# Patient Record
Sex: FEMALE | Race: WHITE | Employment: UNEMPLOYED | URBAN - METROPOLITAN AREA
[De-identification: names, ages, dates, MRNs, and addresses within clinical notes are randomized per-mention and may not be internally consistent; named-entity substitution may affect disease eponyms.]

---

## 2017-05-20 ENCOUNTER — OFFICE VISIT (OUTPATIENT)
Dept: INTERNAL MEDICINE CLINIC | Facility: CLINIC | Age: 32
End: 2017-05-20

## 2017-05-20 ENCOUNTER — LAB ENCOUNTER (OUTPATIENT)
Dept: LAB | Age: 32
End: 2017-05-20
Attending: FAMILY MEDICINE
Payer: COMMERCIAL

## 2017-05-20 VITALS
HEIGHT: 63 IN | HEART RATE: 64 BPM | TEMPERATURE: 98 F | RESPIRATION RATE: 16 BRPM | DIASTOLIC BLOOD PRESSURE: 60 MMHG | BODY MASS INDEX: 37.56 KG/M2 | WEIGHT: 212 LBS | SYSTOLIC BLOOD PRESSURE: 92 MMHG

## 2017-05-20 DIAGNOSIS — Z01.84 IMMUNITY STATUS TESTING: Primary | ICD-10-CM

## 2017-05-20 DIAGNOSIS — Z13.21 ENCOUNTER FOR VITAMIN DEFICIENCY SCREENING: ICD-10-CM

## 2017-05-20 DIAGNOSIS — N97.9 INFERTILITY, FEMALE: ICD-10-CM

## 2017-05-20 DIAGNOSIS — Z01.84 IMMUNITY STATUS TESTING: ICD-10-CM

## 2017-05-20 DIAGNOSIS — G47.33 OSA (OBSTRUCTIVE SLEEP APNEA): ICD-10-CM

## 2017-05-20 PROCEDURE — 86762 RUBELLA ANTIBODY: CPT | Performed by: FAMILY MEDICINE

## 2017-05-20 PROCEDURE — 99203 OFFICE O/P NEW LOW 30 MIN: CPT | Performed by: FAMILY MEDICINE

## 2017-05-20 PROCEDURE — 87340 HEPATITIS B SURFACE AG IA: CPT | Performed by: FAMILY MEDICINE

## 2017-05-20 PROCEDURE — 36415 COLL VENOUS BLD VENIPUNCTURE: CPT | Performed by: FAMILY MEDICINE

## 2017-05-20 PROCEDURE — 86735 MUMPS ANTIBODY: CPT | Performed by: FAMILY MEDICINE

## 2017-05-20 PROCEDURE — 86765 RUBEOLA ANTIBODY: CPT | Performed by: FAMILY MEDICINE

## 2017-05-20 PROCEDURE — 82306 VITAMIN D 25 HYDROXY: CPT | Performed by: FAMILY MEDICINE

## 2017-05-20 PROCEDURE — 86706 HEP B SURFACE ANTIBODY: CPT | Performed by: FAMILY MEDICINE

## 2017-05-20 PROCEDURE — 86787 VARICELLA-ZOSTER ANTIBODY: CPT | Performed by: FAMILY MEDICINE

## 2017-05-20 NOTE — PATIENT INSTRUCTIONS
Prevention Guidelines, Women Ages 25 to 44  Screening tests and vaccines are an important part of managing your health. Health counseling is essential, too. Below are guidelines for these, for women ages 25 to 44.  Talk with your healthcare provider to ma Chickenpox (varicella) All women in this age group who have no record of this infection or vaccine 2 doses; the second dose should be given 4 to 8 weeks after the first dose   Hepatitis A Women at increased risk for infection – talk with your healthcare pr Breast cancer and chemoprevention Women at high risk for breast cancer When your risk is known   Diet and exercise Women who are overweight or obese When diagnosed, and then at routine exams   Domestic violence Women at the age in which they are able to ha Breast cancer All women in this age group should talk with their healthcare providers about the need for clinical breast exams (CBE)1 Clinical breast exam every 3 years1   Cervical cancer Women ages 24 and older Women between ages 24 and 34 should have a P Haemophilus influenzae Type B (HIB) Women at increased risk for infection – talk with your healthcare provider 1 to 3 doses   Human papillomavirus (HPV) All women in this age group up to age 32 3 doses; the second dose should be given 1 to 2 months after t 1According to the ACS, women ages 21 to 44 years should have a clinical breast exam (CBE) as part of their routine health exam every 3 years, and breast self-exams are an option for women starting in their 20s. However, the  USPSTF does not recommend CBE. Diabetes mellitus, type 2 Adults with no symptoms who are overweight or obese and have 1 or more other risk factors for diabetes At least every 3 years   Gonorrhea Sexually active women at increased risk for infection At routine exams   Hepatitis C Anyone Pneumococcal conjugate vaccine (PCV13) and pneumococcal polysaccharide vaccine (PPSV23) Women at increased risk for infection – talk with your healthcare provider PCV13: 1 dose ages 23 to 72 (protects against 13 types of pneumococcal bacteria)  PPSV23: 1 t © 0043-9844 The [address] [place] [address], [address]. All rights reserved. This information is not intended as a substitute for professional medical care. Always follow your healthcare professional's instructions.         Prevent Tuberculosis Women at increased risk for infection – talk with your healthcare provider Ask your healthcare provider   Vision All women in this age group At least 1 complete exam in your 25s, and 2 in your 35s   Vaccine2 Who needs it How often   Chickenpox Tetanus/diphtheria/pertussis (Td/Tdap) booster All women in this age group Td every 10 years, or a one-time dose of Tdap instead of a Td booster after age 25, then Td every 10 years   Counseling Who needs it How often   BRCA gene mutation testing for breas Screening Who needs it How often   Alcohol misuse All women in this age group At routine exams   Blood pressure All women in this age group Every 2 years if your blood pressure is less than 120/80 mm Hg; yearly if your systolic blood pressure is 120 to 139 Hepatitis B Women at increased risk for infection – talk with your healthcare provider 3 doses over 6 months; second dose should be given 1 month after the first dose; the third dose should be given at least 2 months after the second dose and at least 4 mo Sexually transmitted infection prevention Women who are sexually active At routine exams   Skin cancer Prevention of skin cancer in fair-skinned adults through age 25 At routine exams   Use of tobacco and the health effects it can cause All women in this a Cervical cancer Women ages 24 and older Women between ages 24 and 34 should have a Pap test every 3 years; women between ages 27 and 72 are advised to have a Pap test plus an HPV test every 5 years   Chlamydia Sexually active women ages 25 and younger, and Human papillomavirus (HPV) All women in this age group up to age 32 3 doses; the second dose should be given 1 to 2 months after the first dose and the third dose given 6 months after the first dose   Influenza (flu) All women in this age group Once a year

## 2017-05-20 NOTE — PROGRESS NOTES
HPI:    Patient ID: Luz Conception is a 27year old female. HPI Here to establish care. 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 Needs titers of immunizations. Up to date on tetanus.    Sees Dr. Rishabh Parikh for infertility and h frequency. Musculoskeletal: Negative for joint swelling and arthralgias. Skin: Negative for rash. Neurological: Negative for dizziness, weakness, numbness and headaches. Hematological: Negative for adenopathy. Does not bruise/bleed easily.    Psychi Visit:  No prescriptions requested or ordered in this encounter    Imaging & Referrals:  None       NV#[id number]

## 2017-05-26 DIAGNOSIS — B06.89: Primary | ICD-10-CM

## 2017-05-26 DIAGNOSIS — R79.89 LOW VITAMIN D LEVEL: Primary | ICD-10-CM

## 2017-06-03 ENCOUNTER — APPOINTMENT (OUTPATIENT)
Dept: LAB | Facility: HOSPITAL | Age: 32
End: 2017-06-03
Attending: FAMILY MEDICINE
Payer: COMMERCIAL

## 2017-06-03 DIAGNOSIS — B06.89: ICD-10-CM

## 2017-06-03 PROCEDURE — 86762 RUBELLA ANTIBODY: CPT

## 2017-06-03 PROCEDURE — 36415 COLL VENOUS BLD VENIPUNCTURE: CPT

## 2018-01-11 ENCOUNTER — TELEPHONE (OUTPATIENT)
Dept: INTERNAL MEDICINE CLINIC | Facility: CLINIC | Age: 33
End: 2018-01-11

## 2018-01-11 DIAGNOSIS — Z13.1 SCREENING FOR DIABETES MELLITUS: ICD-10-CM

## 2018-01-11 DIAGNOSIS — Z13.220 SCREENING, LIPID: Primary | ICD-10-CM

## 2018-01-11 DIAGNOSIS — Z13.0 SCREENING FOR DEFICIENCY ANEMIA: ICD-10-CM

## 2018-01-11 NOTE — TELEPHONE ENCOUNTER
Patient having annual physical.  Please place orders with [facility]. Patient will be fasting.   Future Appointments  Date Time Provider Rosy Singletary   1/16/2018 9:30 AM Shari Miguel DO [facility] IM

## 2018-01-16 ENCOUNTER — TELEPHONE (OUTPATIENT)
Dept: INTERNAL MEDICINE CLINIC | Facility: CLINIC | Age: 33
End: 2018-01-16

## 2018-04-11 ENCOUNTER — OFFICE VISIT (OUTPATIENT)
Dept: INTERNAL MEDICINE CLINIC | Facility: CLINIC | Age: 33
End: 2018-04-11

## 2018-04-11 VITALS
SYSTOLIC BLOOD PRESSURE: 126 MMHG | RESPIRATION RATE: 16 BRPM | TEMPERATURE: 98 F | HEART RATE: 70 BPM | WEIGHT: 217 LBS | DIASTOLIC BLOOD PRESSURE: 78 MMHG | BODY MASS INDEX: 38 KG/M2

## 2018-04-11 DIAGNOSIS — G47.33 OSA (OBSTRUCTIVE SLEEP APNEA): ICD-10-CM

## 2018-04-11 DIAGNOSIS — E03.9 HYPOTHYROIDISM, UNSPECIFIED TYPE: ICD-10-CM

## 2018-04-11 DIAGNOSIS — Z00.00 ANNUAL PHYSICAL EXAM: Primary | ICD-10-CM

## 2018-04-11 DIAGNOSIS — N97.9 INFERTILITY, FEMALE: ICD-10-CM

## 2018-04-11 PROCEDURE — 99395 PREV VISIT EST AGE 18-39: CPT | Performed by: FAMILY MEDICINE

## 2018-04-11 RX ORDER — DOXYCYCLINE HYCLATE 100 MG
TABLET ORAL
Refills: 0 | COMMUNITY
Start: 2018-04-04 | End: 2018-06-22

## 2018-04-11 RX ORDER — CHOLECALCIFEROL (VITAMIN D3) 1250 MCG
CAPSULE ORAL
Refills: 1 | COMMUNITY
Start: 2018-04-02 | End: 2020-01-15

## 2018-04-11 RX ORDER — ASPIRIN 81 MG
TABLET, DELAYED RELEASE (ENTERIC COATED) ORAL
Refills: 1 | COMMUNITY
Start: 2018-04-04 | End: 2020-01-15

## 2018-04-12 PROBLEM — E03.9 HYPOTHYROIDISM: Status: ACTIVE | Noted: 2018-04-12

## 2018-04-12 NOTE — PROGRESS NOTES
HPI:    Patient ID: Jose Antonio Crawley is a 34year old female. HPI Here for annual check-up. Patient is undergoing fertility treatment and has just recently started IVF. No complaints. Trying to eat healthy and exercise. Up to date on pap.  Labs done headaches. Hematological: Negative for adenopathy. Does not bruise/bleed easily. Psychiatric/Behavioral: Negative for dysphoric mood. The patient is not nervous/anxious.              Current Outpatient Prescriptions:  ASPIR-LOW 81 MG Oral Tab EC  Disp: apnea)  Hypothyroidism, unspecified type  1. Reviewed preventive health recommendations with patient. Check labs. Encouraged regular exercise and healthy eating. 2. Continue f/u with Fertility center. 3. Controlled on CPAP. Continue.    4. Reviewed labs

## 2018-04-16 ENCOUNTER — EKG ENCOUNTER (OUTPATIENT)
Dept: LAB | Age: 33
End: 2018-04-16
Attending: OBSTETRICS & GYNECOLOGY
Payer: COMMERCIAL

## 2018-04-16 DIAGNOSIS — Z01.810 PRE-OPERATIVE CARDIOVASCULAR EXAMINATION: Primary | ICD-10-CM

## 2018-04-16 PROCEDURE — 93005 ELECTROCARDIOGRAM TRACING: CPT

## 2018-04-16 PROCEDURE — 93010 ELECTROCARDIOGRAM REPORT: CPT | Performed by: INTERNAL MEDICINE

## 2018-05-07 ENCOUNTER — TELEPHONE (OUTPATIENT)
Dept: INTERNAL MEDICINE CLINIC | Facility: CLINIC | Age: 33
End: 2018-05-07

## 2018-05-07 NOTE — TELEPHONE ENCOUNTER
Patient states she is getting a job as a  and needs a TB skin test.  She would like to know if Dr Zenaida Roberts would order this test?  Please advise.

## 2018-05-08 NOTE — TELEPHONE ENCOUNTER
Patient states no previous positive TB tests. Patient is scheduled.   Future Appointments  Date Time Provider Rosy Singletary   5/9/2018 1:00 PM [facility] 35 NURSE [facility] IM   5/11/2018 4:00 PM EMG 35 NURSE [facility] 75[facility] IM

## 2018-05-09 ENCOUNTER — NURSE ONLY (OUTPATIENT)
Dept: INTERNAL MEDICINE CLINIC | Facility: CLINIC | Age: 33
End: 2018-05-09

## 2018-05-09 PROCEDURE — 86580 TB INTRADERMAL TEST: CPT | Performed by: FAMILY MEDICINE

## 2018-05-11 ENCOUNTER — TELEPHONE (OUTPATIENT)
Dept: INTERNAL MEDICINE CLINIC | Facility: CLINIC | Age: 33
End: 2018-05-11

## 2018-05-11 NOTE — TELEPHONE ENCOUNTER
Called patient in regards to today's No Show. LVM that we close at 4:30 today. I also explained that the nurse stresses the importance of having the TB read today, or she will have to start the testing over.

## 2018-05-22 ENCOUNTER — OFFICE VISIT (OUTPATIENT)
Dept: FAMILY MEDICINE CLINIC | Facility: CLINIC | Age: 33
End: 2018-05-22

## 2018-05-22 DIAGNOSIS — Z11.1 SCREENING EXAMINATION FOR PULMONARY TUBERCULOSIS: Primary | ICD-10-CM

## 2018-05-22 PROCEDURE — 86580 TB INTRADERMAL TEST: CPT | Performed by: PHYSICIAN ASSISTANT

## 2018-05-22 NOTE — PATIENT INSTRUCTIONS
Please return to clinic on 5/24/18 after 515pm or on 5/25/18 before 515pm to have your TB test read.     If you do not return during this time your test will need to be repeated.      Our clinic hours are:  Monday-Friday        8:00 am to 7:30 pm  Saturday

## 2018-05-22 NOTE — PROGRESS NOTES
Linda Gonzalez is a 34year old female who presents for TB testing. TUBERCULOSIS SCREENING QUESTIONNAIRE    · Live vaccines in the past month? no  · Any steroid medication in the past month? no  · History of BCG vaccine?     no  · If female, ar

## 2018-05-24 ENCOUNTER — OFFICE VISIT (OUTPATIENT)
Dept: FAMILY MEDICINE CLINIC | Facility: CLINIC | Age: 33
End: 2018-05-24

## 2018-05-24 DIAGNOSIS — Z11.1 SCREENING FOR TUBERCULOSIS: Primary | ICD-10-CM

## 2018-05-24 NOTE — PROGRESS NOTES
Patient reports to clinic at 48 hours after Tb placement. TB read negative. Letter of proof printed for patient.

## 2018-06-13 ENCOUNTER — TELEPHONE (OUTPATIENT)
Dept: INTERNAL MEDICINE CLINIC | Facility: CLINIC | Age: 33
End: 2018-06-13

## 2018-06-13 NOTE — TELEPHONE ENCOUNTER
Pt came to drop off 3 forms for different [employer]. Please call once ready for . She had her CPE on 4/11/18. Forms given to [facility].

## 2018-06-22 ENCOUNTER — HOSPITAL ENCOUNTER (OUTPATIENT)
Age: 33
Discharge: HOME | End: 2018-06-22
Attending: FAMILY MEDICINE
Payer: COMMERCIAL

## 2018-06-22 VITALS
RESPIRATION RATE: 18 BRPM | TEMPERATURE: 99 F | HEART RATE: 81 BPM | DIASTOLIC BLOOD PRESSURE: 86 MMHG | OXYGEN SATURATION: 100 % | SYSTOLIC BLOOD PRESSURE: 137 MMHG

## 2018-06-22 DIAGNOSIS — J02.9 SORE THROAT: Primary | ICD-10-CM

## 2018-06-22 PROCEDURE — 99214 OFFICE O/P EST MOD 30 MIN: CPT

## 2018-06-22 PROCEDURE — 87081 CULTURE SCREEN ONLY: CPT | Performed by: FAMILY MEDICINE

## 2018-06-22 PROCEDURE — 99203 OFFICE O/P NEW LOW 30 MIN: CPT

## 2018-06-22 PROCEDURE — 87430 STREP A AG IA: CPT | Performed by: FAMILY MEDICINE

## 2018-06-23 NOTE — ED PROVIDER NOTES
Patient Seen in: [facility]    History   Patient presents with:  Sore Throat    Stated Complaint: sore throat    HPI    This 60-year-old female presents the office with complaint of sore throat for about a week.   She states her seen,airway patent, uvula midline  NECK:  Shotty anterior cervical lymphadenopathy. No thyromegaly,  HEART: Regular rate and rhythm, no S3, S4 or murmur noted. LUNGS: Clear to ausculation. No retractions or tachypnea noted. EXTREMITIES: No edema noted.

## 2018-09-21 ENCOUNTER — LAB ENCOUNTER (OUTPATIENT)
Dept: LAB | Age: 33
End: 2018-09-21
Attending: OBSTETRICS & GYNECOLOGY
Payer: COMMERCIAL

## 2018-09-21 DIAGNOSIS — Z01.810 PRE-OPERATIVE CARDIOVASCULAR EXAMINATION: Primary | ICD-10-CM

## 2018-09-21 PROCEDURE — 93010 ELECTROCARDIOGRAM REPORT: CPT | Performed by: OBSTETRICS & GYNECOLOGY

## 2018-09-21 PROCEDURE — 93005 ELECTROCARDIOGRAM TRACING: CPT

## 2018-10-13 ENCOUNTER — LAB ENCOUNTER (OUTPATIENT)
Dept: LAB | Facility: HOSPITAL | Age: 33
End: 2018-10-13
Attending: FAMILY MEDICINE
Payer: COMMERCIAL

## 2018-10-13 DIAGNOSIS — Z01.84 IMMUNITY STATUS TESTING: ICD-10-CM

## 2018-10-13 PROCEDURE — 36415 COLL VENOUS BLD VENIPUNCTURE: CPT

## 2018-10-13 PROCEDURE — 86765 RUBEOLA ANTIBODY: CPT

## 2018-10-13 PROCEDURE — 86735 MUMPS ANTIBODY: CPT

## 2018-10-13 PROCEDURE — 86762 RUBELLA ANTIBODY: CPT

## 2018-10-19 ENCOUNTER — OFFICE VISIT (OUTPATIENT)
Dept: FAMILY MEDICINE CLINIC | Facility: CLINIC | Age: 33
End: 2018-10-19
Payer: COMMERCIAL

## 2018-10-19 DIAGNOSIS — Z23 NEED FOR VACCINATION: Primary | ICD-10-CM

## 2018-10-19 PROCEDURE — 90471 IMMUNIZATION ADMIN: CPT | Performed by: NURSE PRACTITIONER

## 2018-10-19 PROCEDURE — 90715 TDAP VACCINE 7 YRS/> IM: CPT | Performed by: NURSE PRACTITIONER

## 2018-11-30 ENCOUNTER — OFFICE VISIT (OUTPATIENT)
Dept: FAMILY MEDICINE CLINIC | Facility: CLINIC | Age: 33
End: 2018-11-30
Payer: COMMERCIAL

## 2018-11-30 DIAGNOSIS — Z23 NEED FOR TD VACCINE: Primary | ICD-10-CM

## 2018-11-30 PROCEDURE — 90714 TD VACC NO PRESV 7 YRS+ IM: CPT | Performed by: PHYSICIAN ASSISTANT

## 2018-11-30 PROCEDURE — 90471 IMMUNIZATION ADMIN: CPT | Performed by: PHYSICIAN ASSISTANT

## 2018-11-30 NOTE — PROGRESS NOTES
Here for Td vaccine. Reports no previous reaction to any vaccine. Feels well today. Consent completed and reviewed. VIS given. Td vaccine administered to left deltoid. Pt tolerated well. Patient needs 2 doses of Td vaccine spaced 6 months apart.  Had Tdap

## 2019-05-17 ENCOUNTER — TELEPHONE (OUTPATIENT)
Dept: INTERNAL MEDICINE CLINIC | Facility: CLINIC | Age: 34
End: 2019-05-17

## 2019-05-17 DIAGNOSIS — Z00.00 ROUTINE GENERAL MEDICAL EXAMINATION AT A HEALTH CARE FACILITY: Primary | ICD-10-CM

## 2019-05-17 NOTE — TELEPHONE ENCOUNTER
Future Appointments   Date Time Provider Rosy Singletary   5/31/2019  8:30 AM Ivan Gomez DO [facility] IM   7/15/2019  3:00 PM [facility] [id number]     Orders to  Edward-Pt aware to fast-no call back required

## 2019-05-31 ENCOUNTER — TELEPHONE (OUTPATIENT)
Dept: INTERNAL MEDICINE CLINIC | Facility: CLINIC | Age: 34
End: 2019-05-31

## 2019-06-25 ENCOUNTER — HOSPITAL ENCOUNTER (OUTPATIENT)
Age: 34
Discharge: HOME | End: 2019-06-25
Attending: FAMILY MEDICINE
Payer: COMMERCIAL

## 2019-06-25 VITALS
BODY MASS INDEX: 38.09 KG/M2 | HEIGHT: 63 IN | TEMPERATURE: 98 F | WEIGHT: 215 LBS | RESPIRATION RATE: 18 BRPM | OXYGEN SATURATION: 97 % | SYSTOLIC BLOOD PRESSURE: 122 MMHG | DIASTOLIC BLOOD PRESSURE: 77 MMHG | HEART RATE: 85 BPM

## 2019-06-25 DIAGNOSIS — O26.892 ABDOMINAL PAIN DURING PREGNANCY IN SECOND TRIMESTER: Primary | ICD-10-CM

## 2019-06-25 DIAGNOSIS — R10.9 ABDOMINAL PAIN DURING PREGNANCY IN SECOND TRIMESTER: Primary | ICD-10-CM

## 2019-06-25 PROCEDURE — 87086 URINE CULTURE/COLONY COUNT: CPT | Performed by: FAMILY MEDICINE

## 2019-06-25 PROCEDURE — 99213 OFFICE O/P EST LOW 20 MIN: CPT

## 2019-06-25 PROCEDURE — 81002 URINALYSIS NONAUTO W/O SCOPE: CPT | Performed by: FAMILY MEDICINE

## 2019-06-25 PROCEDURE — 99214 OFFICE O/P EST MOD 30 MIN: CPT

## 2019-06-25 RX ORDER — MELATONIN
325
COMMUNITY
End: 2020-01-15

## 2019-06-25 RX ORDER — CETIRIZINE HYDROCHLORIDE 10 MG/1
10 TABLET ORAL DAILY
COMMUNITY

## 2019-06-25 NOTE — ED INITIAL ASSESSMENT (HPI)
Patient is 17 weeks pregnant and is here today for lower abdominal pain/tightness. Pain started yesterday around 1600. Feels like a \"golf ball\" inside right hip and pain radiates to lower abdomen. Also has low mid back pain/spasms.   Denies vaginal dis

## 2019-06-25 NOTE — ED PROVIDER NOTES
Patient Seen in: [facility]    History   Patient presents with:  Abdomen/Flank Pain (GI/)    Stated Complaint: 17 weeks pregnant, abdominal discomfort     HPI    78-year-old female at 12 weeks gestational age presents the Pulse 85   Temp 98.1 °F (36.7 °C) (Temporal)   Resp 18   Ht 160 cm (5' 3\")   Wt 97.5 kg   SpO2 97%   BMI 38.09 kg/m²         Physical Exam    Gen: Pleasant female in NAD  Head: Normocephalic atraumatic. No sinus tenderness on palpation.   Ears: Normal ext her obstetrician              Disposition and Plan     Clinical Impression:  Abdominal pain during pregnancy in second trimester  (primary encounter diagnosis)    Disposition:  Discharge  6/25/2019 12:25 pm    Follow-up:  García Burton MD  [address] [place]

## 2019-07-15 ENCOUNTER — OFFICE VISIT (OUTPATIENT)
Dept: PERINATAL CARE | Facility: HOSPITAL | Age: 34
End: 2019-07-15
Attending: OBSTETRICS & GYNECOLOGY

## 2019-07-15 VITALS
HEIGHT: 63 IN | WEIGHT: 215 LBS | HEART RATE: 118 BPM | DIASTOLIC BLOOD PRESSURE: 83 MMHG | BODY MASS INDEX: 38.09 KG/M2 | SYSTOLIC BLOOD PRESSURE: 132 MMHG

## 2019-07-15 DIAGNOSIS — O09.812 PREGNANCY RESULTING FROM IN VITRO FERTILIZATION IN SECOND TRIMESTER: ICD-10-CM

## 2019-07-15 DIAGNOSIS — O09.522 MULTIGRAVIDA OF ADVANCED MATERNAL AGE IN SECOND TRIMESTER: ICD-10-CM

## 2019-07-15 DIAGNOSIS — G47.33 OSA (OBSTRUCTIVE SLEEP APNEA): ICD-10-CM

## 2019-07-15 DIAGNOSIS — E66.09 NON MORBID OBESITY DUE TO EXCESS CALORIES: ICD-10-CM

## 2019-07-15 PROCEDURE — 99243 OFF/OP CNSLTJ NEW/EST LOW 30: CPT | Performed by: OBSTETRICS & GYNECOLOGY

## 2019-07-15 PROCEDURE — 76811 OB US DETAILED SNGL FETUS: CPT | Performed by: OBSTETRICS & GYNECOLOGY

## 2019-07-15 NOTE — PROGRESS NOTES
Indication: AMA, IVF, Increased BMI.   ____________________________________________________________________________  History: Age: 28 years. Maternal age at [facility]: 27 years.  : 1 Para: 0.  _____________________________________________________________ suboptimal.    ____________________________________________________________________________  Maternal Structures:  Cervical length 48.0 mm.  ____________________________________________________________________________  Comments:    Mattie Bonilla , hypertension and diabetes. The incidence of preeclampsia in the general obstetric population is 3 to 4 percent; this increases to 5 to 10 percent in women over age 36 and is as high as 28 percent in women over age 48.    The incidence of gestational diabe karyotype.       Fetal Aneuploidy      Invasive Testing  I offered invasive genetic testing (amniocentesis, chorionic villus sampling) after reviewing the diagnostic accuracy of these tests as well as the procedure associated loss rate (1:500 for genetic am offspring with congenital malformations compared with fertile women who conceive naturally. Heart defects have been reported as high at 6 % so fetal echocardiography is recommended in all IVF patients.  Stillbirth and  mortality rates appear to however, obese women with a history of gestational diabetes have a two-fold increased prevalence of subsequent type 2 diabetes. An association between obesity and hypertensive disorders during pregnancy has been consistently reported.   In particu measurements are consistent with the established EDC. No ultrasound evidence of structural abnormalities are seen today. The nasal bone is present. No ultrasound evidence of markers for aneuploidy are seen.  She understands that ultrasound exam cannot ex

## 2019-08-09 NOTE — PROGRESS NOTES
Jack Grover  Dear Dr. Haylee Bonilla,     Thank you for requesting ultrasound evaluation and maternal fetal medicine consultation on your patient Eric Middleton.   As you are aware she is a 27year old female  with a Si examined.    ____________________________________________________________________________  Echocardiography:    Image quality:  Limited  Situs:  Normal  Position of stomach:  Left sided  Heart size:  Normal  Position of apex:  normal  Systemic veins:  Norm evaluated.       DISCUSSION  During her visit we discussed and reviewed the following issues:  Advanced maternal age  We reviewed the risks, both maternal and fetal, of advancing maternal age and pregnancy.   We discussed risks for fetal growth abnormalitie airflow) during sleep despite respiratory effort. ALYSSA is characterized by repetitive partial or complete episodes of upper airway obstruction during sleep.  The obstruction results in a reduction of airflow, hypoxemia, sympathetic discharge, and recurrent a anesthesia service is consulted prenatally, but at least early in the patient’s labor. Early placement of regional anesthesia may prevent the need for general anesthesia if emergent  delivery becomes necessary.  Women who have been using CPAP, anoth

## 2019-08-15 ENCOUNTER — OFFICE VISIT (OUTPATIENT)
Dept: PERINATAL CARE | Facility: HOSPITAL | Age: 34
End: 2019-08-15
Attending: OBSTETRICS & GYNECOLOGY
Payer: COMMERCIAL

## 2019-08-15 VITALS
SYSTOLIC BLOOD PRESSURE: 142 MMHG | DIASTOLIC BLOOD PRESSURE: 89 MMHG | BODY MASS INDEX: 38.8 KG/M2 | HEIGHT: 63 IN | WEIGHT: 219 LBS | HEART RATE: 109 BPM

## 2019-08-15 DIAGNOSIS — O99.212 OBESITY AFFECTING PREGNANCY IN SECOND TRIMESTER: ICD-10-CM

## 2019-08-15 DIAGNOSIS — O09.812 PREGNANCY RESULTING FROM ASSISTED REPRODUCTIVE TECHNOLOGY IN SECOND TRIMESTER: ICD-10-CM

## 2019-08-15 DIAGNOSIS — M54.50 CHRONIC BILATERAL LOW BACK PAIN WITHOUT SCIATICA: ICD-10-CM

## 2019-08-15 DIAGNOSIS — G89.29 CHRONIC BILATERAL LOW BACK PAIN WITHOUT SCIATICA: ICD-10-CM

## 2019-08-15 DIAGNOSIS — E03.9 HYPOTHYROIDISM, UNSPECIFIED TYPE: ICD-10-CM

## 2019-08-15 DIAGNOSIS — E66.09 NON MORBID OBESITY DUE TO EXCESS CALORIES: ICD-10-CM

## 2019-08-15 DIAGNOSIS — G47.33 OSA (OBSTRUCTIVE SLEEP APNEA): ICD-10-CM

## 2019-08-15 PROCEDURE — 76827 ECHO EXAM OF FETAL HEART: CPT | Performed by: OBSTETRICS & GYNECOLOGY

## 2019-08-15 PROCEDURE — 76815 OB US LIMITED FETUS(S): CPT | Performed by: OBSTETRICS & GYNECOLOGY

## 2019-08-15 PROCEDURE — 99215 OFFICE O/P EST HI 40 MIN: CPT | Performed by: OBSTETRICS & GYNECOLOGY

## 2019-08-15 PROCEDURE — 76825 ECHO EXAM OF FETAL HEART: CPT | Performed by: OBSTETRICS & GYNECOLOGY

## 2019-08-15 PROCEDURE — 93325 DOPPLER ECHO COLOR FLOW MAPG: CPT | Performed by: OBSTETRICS & GYNECOLOGY

## 2019-09-11 LAB
AMB EXT TSH: 2.26 MIU/ML
ANTIBODY SCREEN: NEGATIVE
FERRITIN: 31 NG/ML
GLUCOSE, 1 HOUR: 170 MG/DL
HCT: 35.2 PERCENT
HGB: 11.3 GM/DL
MEAN CELL VOLUME: 93 FL
MEAN CORPUSCULAR HEMOGLOBIN: 30 PG
MEAN CORPUSCULAR HGB CONC: 32 PERCENT
MEAN PLATELET VOLUME: 10.9 FL
PLT: 289 K/CU MM
RED BLOOD COUNT: 3.8 M/CU MM
RED CELL DISTRIBUTION WIDTH: 14 PERCENT
T3 TOTAL: 2.3 NG/ML
T4, FREE: 0.64 NG/DL
VITAMIN D, 25-HYDROXY: 38 NG/ML
WBC: 10 K/CU MM

## 2019-09-13 ENCOUNTER — TELEPHONE (OUTPATIENT)
Dept: INTERNAL MEDICINE CLINIC | Facility: CLINIC | Age: 34
End: 2019-09-13

## 2019-09-13 NOTE — TELEPHONE ENCOUNTER
Received lab results from [facility] stating pt to schedule f/u appt with AMS. Abstracted and placed in [facility]MS top bin for review. Called pt and scheduled appt for f/u on 9/20 at 11:45a. Pt verbalized understanding.

## 2019-09-15 ENCOUNTER — HOSPITAL ENCOUNTER (OUTPATIENT)
Facility: HOSPITAL | Age: 34
Setting detail: OBSERVATION
Discharge: HOME | End: 2019-09-15
Attending: OBSTETRICS & GYNECOLOGY | Admitting: OBSTETRICS & GYNECOLOGY
Payer: COMMERCIAL

## 2019-09-15 VITALS
WEIGHT: 221 LBS | BODY MASS INDEX: 39 KG/M2 | SYSTOLIC BLOOD PRESSURE: 121 MMHG | TEMPERATURE: 98 F | DIASTOLIC BLOOD PRESSURE: 72 MMHG | HEART RATE: 98 BPM | RESPIRATION RATE: 16 BRPM

## 2019-09-15 PROBLEM — Z34.90 PREGNANCY: Status: ACTIVE | Noted: 2019-09-15

## 2019-09-15 PROCEDURE — 99213 OFFICE O/P EST LOW 20 MIN: CPT

## 2019-09-15 RX ORDER — CALCIUM CARBONATE 200(500)MG
1 TABLET,CHEWABLE ORAL DAILY
COMMUNITY

## 2019-09-15 NOTE — NST
Nonstress Test   Patient: Marcella Rowell    Gestation: 29w1d    NST:       Variability: Moderate           Accelerations: Yes           Decelerations: None            Baseline: 140 BPM           Uterine Irritability: No           Contractions: Not pr

## 2019-09-15 NOTE — PROGRESS NOTES
Updated  about the pt status and fht's.   Orders received to discharge pt home with  labor instruction and follow up in the [facility] office for the next scheduled appointment
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
## 2019-09-15 NOTE — PROGRESS NOTES
Discharge instruction given to pt, pt verbalizes understanding, vital signs stable on transfer, All pt belongings sent with pt on transfer. pt is not in active labor on discharge.

## 2019-09-19 NOTE — PROGRESS NOTES
Hermelinda Jarrett  Dear Dr. Cj Maguire,     Thank you for requesting ultrasound evaluation and maternal fetal medicine consultation on your patient Gissell Tipton.  As you are aware she is a 27year Adam Hem a Si Fetal Anatomy:  Visualized with normal appearance: 4 chamber heart and great vessels, bladder. Brain: Visualized and normal appearance: cerebellum. Gastrointestinal Tract: stomach visible. Summary of Ultrasound Findings:  Impression:  The fetal s repeat consultation if medication needed for glucose control    AMA  See prior MFM notes for a detailed review. IVF GESTATION  See prior MFM notes for a detailed review.     OBESITY:  Her BMI prior to pregnancy was 45  See prior MFM notes for a detailed pregnancy  · High BMI  · AMA  · ALYSSA  · GDM  · Large for gestational age growth and polyhydramnios: Likely secondary to gestational diabetes     RECOMMENDATIONS:  · Continue care with Dr. Rachana Levi four times daily capillary blood glucose assessmen

## 2019-09-20 ENCOUNTER — OFFICE VISIT (OUTPATIENT)
Dept: PERINATAL CARE | Facility: HOSPITAL | Age: 34
End: 2019-09-20
Attending: OBSTETRICS & GYNECOLOGY
Payer: COMMERCIAL

## 2019-09-20 VITALS
HEART RATE: 99 BPM | BODY MASS INDEX: 40 KG/M2 | WEIGHT: 223 LBS | SYSTOLIC BLOOD PRESSURE: 121 MMHG | DIASTOLIC BLOOD PRESSURE: 86 MMHG

## 2019-09-20 DIAGNOSIS — G47.33 OSA (OBSTRUCTIVE SLEEP APNEA): ICD-10-CM

## 2019-09-20 DIAGNOSIS — O24.419 GESTATIONAL DIABETES MELLITUS (GDM) IN THIRD TRIMESTER, GESTATIONAL DIABETES METHOD OF CONTROL UNSPECIFIED: Primary | ICD-10-CM

## 2019-09-20 PROCEDURE — 76816 OB US FOLLOW-UP PER FETUS: CPT | Performed by: OBSTETRICS & GYNECOLOGY

## 2019-09-20 PROCEDURE — 99242 OFF/OP CONSLTJ NEW/EST SF 20: CPT | Performed by: OBSTETRICS & GYNECOLOGY

## 2019-09-23 ENCOUNTER — DIABETIC EDUCATION (OUTPATIENT)
Dept: ENDOCRINOLOGY CLINIC | Facility: CLINIC | Age: 34
End: 2019-09-23
Payer: COMMERCIAL

## 2019-09-23 ENCOUNTER — TELEPHONE (OUTPATIENT)
Dept: INTERNAL MEDICINE CLINIC | Facility: CLINIC | Age: 34
End: 2019-09-23

## 2019-09-23 VITALS — BODY MASS INDEX: 39.51 KG/M2 | WEIGHT: 223 LBS | HEIGHT: 63 IN

## 2019-09-23 DIAGNOSIS — O24.410 DIET CONTROLLED GESTATIONAL DIABETES MELLITUS (GDM) IN THIRD TRIMESTER: Primary | ICD-10-CM

## 2019-09-23 PROCEDURE — 97802 MEDICAL NUTRITION INDIV IN: CPT | Performed by: DIETITIAN, REGISTERED

## 2019-09-23 NOTE — PROGRESS NOTES
Santi Nancias was seen for Gestational Diabetes Counseling: Individual/Group    Date: 9/23/2019  Referring Provider: Dr. Alex Harding  Start time: 2:30 End time: 3:45    Assessment:Ht 63\"   Wt 223 lb   BMI 39.50 kg/m²     SANDRA: 11/20/19 management/prevention of Type 2 DM, and increased risk of having diabetes later in life reviewed. Healthy Coping:  Family involvement/social support encouraged. Identification of lifestyle behaviors willing to change discussed.     Training Tools Provid

## 2019-09-25 ENCOUNTER — OFFICE VISIT (OUTPATIENT)
Dept: INTERNAL MEDICINE CLINIC | Facility: CLINIC | Age: 34
End: 2019-09-25
Payer: COMMERCIAL

## 2019-09-25 VITALS
SYSTOLIC BLOOD PRESSURE: 114 MMHG | BODY MASS INDEX: 39.34 KG/M2 | HEART RATE: 88 BPM | TEMPERATURE: 98 F | DIASTOLIC BLOOD PRESSURE: 70 MMHG | RESPIRATION RATE: 18 BRPM | WEIGHT: 222 LBS | HEIGHT: 63 IN

## 2019-09-25 DIAGNOSIS — Z3A.30 30 WEEKS GESTATION OF PREGNANCY: ICD-10-CM

## 2019-09-25 DIAGNOSIS — E03.8 SUBCLINICAL HYPOTHYROIDISM: Primary | ICD-10-CM

## 2019-09-25 PROCEDURE — 99214 OFFICE O/P EST MOD 30 MIN: CPT | Performed by: FAMILY MEDICINE

## 2019-09-26 LAB — AMB EXT TSH: 2.11 MIU/ML

## 2019-09-27 ENCOUNTER — MED REC SCAN ONLY (OUTPATIENT)
Dept: INTERNAL MEDICINE CLINIC | Facility: CLINIC | Age: 34
End: 2019-09-27

## 2019-09-27 NOTE — PROGRESS NOTES
HPI:    Patient ID: Patience Reynolds is a 27year old female. HPI  Here at request of OB. Patient is 30+ weeks pregnant after IVF.  Was on levothyroxine for reported subclinical hypothyroid until 12 weeks pregnancy and stopped med at instruction from Normocephalic and atraumatic. Pulmonary/Chest: Effort normal.   Neurological: She is alert. Psychiatric: She has a normal mood and affect. Her behavior is normal.   Vitals reviewed.              ASSESSMENT/PLAN:   Subclinical hypothyroidism  (primary en

## 2019-09-28 ENCOUNTER — TELEPHONE (OUTPATIENT)
Dept: INTERNAL MEDICINE CLINIC | Facility: CLINIC | Age: 34
End: 2019-09-28

## 2019-09-28 NOTE — TELEPHONE ENCOUNTER
Spoke with  Geraldine Wendy at his office visit today to inform him to tell wife her T4 was still low so still subclinical hypothyroid on repeat labs and that she should restart levothyroxine and recheck levels in 6 weeks. Rx sent to pharmacy.  However, up

## 2019-10-18 ENCOUNTER — OFFICE VISIT (OUTPATIENT)
Dept: PERINATAL CARE | Facility: HOSPITAL | Age: 34
End: 2019-10-18
Attending: OBSTETRICS & GYNECOLOGY
Payer: COMMERCIAL

## 2019-10-18 VITALS
BODY MASS INDEX: 39 KG/M2 | HEART RATE: 73 BPM | SYSTOLIC BLOOD PRESSURE: 122 MMHG | WEIGHT: 218 LBS | DIASTOLIC BLOOD PRESSURE: 86 MMHG

## 2019-10-18 DIAGNOSIS — O24.410 DIET CONTROLLED GESTATIONAL DIABETES MELLITUS (GDM) IN THIRD TRIMESTER: ICD-10-CM

## 2019-10-18 DIAGNOSIS — G47.33 OSA (OBSTRUCTIVE SLEEP APNEA): ICD-10-CM

## 2019-10-18 DIAGNOSIS — O99.213 OBESITY AFFECTING PREGNANCY IN THIRD TRIMESTER: ICD-10-CM

## 2019-10-18 PROCEDURE — 76816 OB US FOLLOW-UP PER FETUS: CPT | Performed by: OBSTETRICS & GYNECOLOGY

## 2019-10-18 PROCEDURE — 99213 OFFICE O/P EST LOW 20 MIN: CPT | Performed by: OBSTETRICS & GYNECOLOGY

## 2019-10-18 PROCEDURE — 76819 FETAL BIOPHYS PROFIL W/O NST: CPT | Performed by: OBSTETRICS & GYNECOLOGY

## 2019-10-18 NOTE — PROGRESS NOTES
Outpatient Maternal-Fetal Medicine Follow-Up     Dear Alton Epps,     Thank you for requesting ultrasound evaluation and maternal fetal medicine consultation on your Alyson Horton.  As you are aware she is a 27year Juneadrián perez Si Placenta: anterior. Fetal Anatomy:  Visualized with normal appearance: spine, kidneys, bladder. Brain: Visualized and normal appearance: Cisterna Magna. Gastrointestinal Tract: stomach visible. Summary of Ultrasound Findings:  Impression:  The of polyhydramnios including obstruction, neurologic, karyotypic, nonkaryotypic and diabetes. Hydramnios may increase the risk of PTL/PTD and abruptio placenta if rupture membrane rupture occurs.  In most  Cases (~70%) it is idiopathic and approximately 2/3 required  · If medication required for gestational diabetes delivery will be advised to 38 weeks gestation  · Use CPAP  · Limit weight gain to 5-15 lbs in pregnancy     Thank you for allowing me to participate in the care of your patient. India Alexander do not he

## 2019-10-22 ENCOUNTER — TELEPHONE (OUTPATIENT)
Dept: PERINATAL CARE | Facility: HOSPITAL | Age: 34
End: 2019-10-22

## 2019-10-22 DIAGNOSIS — O24.415 GESTATIONAL DIABETES MELLITUS (GDM) IN THIRD TRIMESTER CONTROLLED ON ORAL HYPOGLYCEMIC DRUG: Primary | ICD-10-CM

## 2019-10-22 NOTE — TELEPHONE ENCOUNTER
I called the patient to discuss her blood sugar log. She is doing very well testing her blood sugar 4 times daily. Her postprandial levels are essentially all within normal range. Fasting levels are running high.   The ranges 92-1 05 with 6 of 7 being ab

## 2019-11-02 ENCOUNTER — APPOINTMENT (OUTPATIENT)
Dept: ULTRASOUND IMAGING | Facility: HOSPITAL | Age: 34
End: 2019-11-02
Attending: OBSTETRICS & GYNECOLOGY
Payer: COMMERCIAL

## 2019-11-02 ENCOUNTER — HOSPITAL ENCOUNTER (OUTPATIENT)
Facility: HOSPITAL | Age: 34
Setting detail: OBSERVATION
Discharge: HOME | End: 2019-11-03
Attending: OBSTETRICS & GYNECOLOGY | Admitting: OBSTETRICS & GYNECOLOGY
Payer: COMMERCIAL

## 2019-11-02 PROCEDURE — 80053 COMPREHEN METABOLIC PANEL: CPT | Performed by: OBSTETRICS & GYNECOLOGY

## 2019-11-02 PROCEDURE — 84550 ASSAY OF BLOOD/URIC ACID: CPT | Performed by: OBSTETRICS & GYNECOLOGY

## 2019-11-02 PROCEDURE — 36415 COLL VENOUS BLD VENIPUNCTURE: CPT

## 2019-11-02 PROCEDURE — 85025 COMPLETE CBC W/AUTO DIFF WBC: CPT | Performed by: OBSTETRICS & GYNECOLOGY

## 2019-11-02 PROCEDURE — 76815 OB US LIMITED FETUS(S): CPT | Performed by: OBSTETRICS & GYNECOLOGY

## 2019-11-02 RX ORDER — CHOLECALCIFEROL (VITAMIN D3) 125 MCG
1 CAPSULE ORAL NIGHTLY
COMMUNITY
End: 2020-01-15

## 2019-11-03 VITALS
HEIGHT: 63 IN | TEMPERATURE: 98 F | DIASTOLIC BLOOD PRESSURE: 80 MMHG | BODY MASS INDEX: 39.34 KG/M2 | WEIGHT: 222 LBS | HEART RATE: 80 BPM | SYSTOLIC BLOOD PRESSURE: 135 MMHG

## 2019-11-03 PROCEDURE — 59025 FETAL NON-STRESS TEST: CPT

## 2019-11-03 PROCEDURE — 99213 OFFICE O/P EST LOW 20 MIN: CPT

## 2019-11-03 NOTE — PROGRESS NOTES
Pt is a 27year old female admitted to TRG4/TRG4-A.    Patient presents with:  Headache: sudden moderate headache 4/10 @ 2030, took 1g tylenol @ 2115 w/ no relief; checked BP d/t high measurement in office on Friday; denies visual changes or epigastric pain

## 2019-11-03 NOTE — NST
Nonstress Test   Patient: Gweneth Mauro    Gestation: 36w1d    NST:       Variability: Moderate           Accelerations: Yes           Decelerations: None            Baseline: 135 BPM           Uterine Irritability: No           Contractions: Irregu

## 2019-11-11 ENCOUNTER — TELEPHONE (OUTPATIENT)
Dept: OBGYN UNIT | Facility: HOSPITAL | Age: 34
End: 2019-11-11

## 2019-11-12 ENCOUNTER — TELEPHONE (OUTPATIENT)
Dept: OBGYN UNIT | Facility: HOSPITAL | Age: 34
End: 2019-11-12

## 2019-11-13 NOTE — PROGRESS NOTES
Outpatient Maternal-Fetal Medicine Follow-Up     Dear Chloé Hardy,     Thank you for requesting ultrasound evaluation and maternal fetal medicine consultation on your Malachi Otero.  As you are aware she is a 27year [facility] Si Anatomy:  Visualized with normal appearance: kidneys, bladder. Head: suboptimal views. Spine: appears normal but suboptimal  Heart: suboptimal.   Gastrointestinal Tract: stomach visible. Summary of Ultrasound Findings:  Impression:  The fetal growth (fasting and 2 hour postprandial)  · Weekly Maternal-Fetal Medicine review of capillary blood glucose values  · Twice-weekly NST's  · Delivery advised at 38 weeks gestation  · Use CPAP  · Limit weight gain to 5-15 lbs in pregnancy     Thank you for allowin

## 2019-11-15 ENCOUNTER — OFFICE VISIT (OUTPATIENT)
Dept: PERINATAL CARE | Facility: HOSPITAL | Age: 34
End: 2019-11-15
Attending: OBSTETRICS & GYNECOLOGY
Payer: COMMERCIAL

## 2019-11-15 VITALS
HEART RATE: 86 BPM | DIASTOLIC BLOOD PRESSURE: 81 MMHG | SYSTOLIC BLOOD PRESSURE: 148 MMHG | BODY MASS INDEX: 40 KG/M2 | WEIGHT: 226 LBS

## 2019-11-15 DIAGNOSIS — O24.410 DIET CONTROLLED GESTATIONAL DIABETES MELLITUS (GDM) IN THIRD TRIMESTER: Primary | ICD-10-CM

## 2019-11-15 DIAGNOSIS — O09.813 HIGH RISK PREGNANCY DUE TO ASSISTED REPRODUCTIVE TECHNOLOGY IN THIRD TRIMESTER: ICD-10-CM

## 2019-11-15 DIAGNOSIS — O99.213 OBESITY AFFECTING PREGNANCY IN THIRD TRIMESTER: ICD-10-CM

## 2019-11-15 PROCEDURE — 76816 OB US FOLLOW-UP PER FETUS: CPT | Performed by: OBSTETRICS & GYNECOLOGY

## 2019-11-15 PROCEDURE — 99213 OFFICE O/P EST LOW 20 MIN: CPT | Performed by: OBSTETRICS & GYNECOLOGY

## 2019-11-15 PROCEDURE — 76818 FETAL BIOPHYS PROFILE W/NST: CPT | Performed by: OBSTETRICS & GYNECOLOGY

## 2019-11-17 ENCOUNTER — APPOINTMENT (OUTPATIENT)
Dept: OBGYN CLINIC | Facility: HOSPITAL | Age: 34
End: 2019-11-17
Payer: COMMERCIAL

## 2019-11-17 ENCOUNTER — HOSPITAL ENCOUNTER (INPATIENT)
Facility: HOSPITAL | Age: 34
LOS: 4 days | Discharge: HOME | End: 2019-11-21
Attending: OBSTETRICS & GYNECOLOGY | Admitting: OBSTETRICS & GYNECOLOGY
Payer: COMMERCIAL

## 2019-11-17 PROBLEM — Z34.90 PREGNANCY: Status: ACTIVE | Noted: 2019-11-17

## 2019-11-17 PROCEDURE — 85027 COMPLETE CBC AUTOMATED: CPT | Performed by: OBSTETRICS & GYNECOLOGY

## 2019-11-17 PROCEDURE — 86850 RBC ANTIBODY SCREEN: CPT | Performed by: OBSTETRICS & GYNECOLOGY

## 2019-11-17 PROCEDURE — 84156 ASSAY OF PROTEIN URINE: CPT | Performed by: OBSTETRICS & GYNECOLOGY

## 2019-11-17 PROCEDURE — 82962 GLUCOSE BLOOD TEST: CPT

## 2019-11-17 PROCEDURE — 82570 ASSAY OF URINE CREATININE: CPT | Performed by: OBSTETRICS & GYNECOLOGY

## 2019-11-17 PROCEDURE — 3E0P7VZ INTRODUCTION OF HORMONE INTO FEMALE REPRODUCTIVE, VIA NATURAL OR ARTIFICIAL OPENING: ICD-10-PCS | Performed by: OBSTETRICS & GYNECOLOGY

## 2019-11-17 PROCEDURE — 3E033VJ INTRODUCTION OF OTHER HORMONE INTO PERIPHERAL VEIN, PERCUTANEOUS APPROACH: ICD-10-PCS | Performed by: OBSTETRICS & GYNECOLOGY

## 2019-11-17 PROCEDURE — 80053 COMPREHEN METABOLIC PANEL: CPT | Performed by: OBSTETRICS & GYNECOLOGY

## 2019-11-17 PROCEDURE — 86900 BLOOD TYPING SEROLOGIC ABO: CPT | Performed by: OBSTETRICS & GYNECOLOGY

## 2019-11-17 PROCEDURE — 86901 BLOOD TYPING SEROLOGIC RH(D): CPT | Performed by: OBSTETRICS & GYNECOLOGY

## 2019-11-17 PROCEDURE — 84550 ASSAY OF BLOOD/URIC ACID: CPT | Performed by: OBSTETRICS & GYNECOLOGY

## 2019-11-17 PROCEDURE — 86780 TREPONEMA PALLIDUM: CPT | Performed by: OBSTETRICS & GYNECOLOGY

## 2019-11-17 RX ORDER — IBUPROFEN 600 MG/1
600 TABLET ORAL ONCE AS NEEDED
Status: DISCONTINUED | OUTPATIENT
Start: 2019-11-17 | End: 2019-11-19 | Stop reason: HOSPADM

## 2019-11-17 RX ORDER — SODIUM CHLORIDE, SODIUM LACTATE, POTASSIUM CHLORIDE, CALCIUM CHLORIDE 600; 310; 30; 20 MG/100ML; MG/100ML; MG/100ML; MG/100ML
INJECTION, SOLUTION INTRAVENOUS CONTINUOUS
Status: DISCONTINUED | OUTPATIENT
Start: 2019-11-17 | End: 2019-11-19 | Stop reason: HOSPADM

## 2019-11-17 RX ORDER — DEXTROSE, SODIUM CHLORIDE, SODIUM LACTATE, POTASSIUM CHLORIDE, AND CALCIUM CHLORIDE 5; .6; .31; .03; .02 G/100ML; G/100ML; G/100ML; G/100ML; G/100ML
INJECTION, SOLUTION INTRAVENOUS AS NEEDED
Status: DISCONTINUED | OUTPATIENT
Start: 2019-11-17 | End: 2019-11-19 | Stop reason: HOSPADM

## 2019-11-17 RX ORDER — ZOLPIDEM TARTRATE 5 MG/1
5 TABLET ORAL NIGHTLY PRN
Status: DISCONTINUED | OUTPATIENT
Start: 2019-11-17 | End: 2019-11-19 | Stop reason: HOSPADM

## 2019-11-17 RX ORDER — TRISODIUM CITRATE DIHYDRATE AND CITRIC ACID MONOHYDRATE 500; 334 MG/5ML; MG/5ML
30 SOLUTION ORAL AS NEEDED
Status: DISCONTINUED | OUTPATIENT
Start: 2019-11-17 | End: 2019-11-19 | Stop reason: HOSPADM

## 2019-11-17 RX ORDER — TERBUTALINE SULFATE 1 MG/ML
0.25 INJECTION, SOLUTION SUBCUTANEOUS AS NEEDED
Status: DISCONTINUED | OUTPATIENT
Start: 2019-11-17 | End: 2019-11-19 | Stop reason: HOSPADM

## 2019-11-18 ENCOUNTER — ANESTHESIA (OUTPATIENT)
Dept: OBGYN UNIT | Facility: HOSPITAL | Age: 34
End: 2019-11-18
Payer: COMMERCIAL

## 2019-11-18 ENCOUNTER — ANESTHESIA EVENT (OUTPATIENT)
Dept: OBGYN UNIT | Facility: HOSPITAL | Age: 34
End: 2019-11-18
Payer: COMMERCIAL

## 2019-11-18 PROCEDURE — 82962 GLUCOSE BLOOD TEST: CPT

## 2019-11-18 RX ORDER — BUPIVACAINE HYDROCHLORIDE 2.5 MG/ML
INJECTION, SOLUTION EPIDURAL; INFILTRATION; INTRACAUDAL AS NEEDED
Status: DISCONTINUED | OUTPATIENT
Start: 2019-11-18 | End: 2019-11-19 | Stop reason: SURG

## 2019-11-18 RX ORDER — ONDANSETRON 2 MG/ML
INJECTION INTRAMUSCULAR; INTRAVENOUS
Status: COMPLETED
Start: 2019-11-18 | End: 2019-11-18

## 2019-11-18 RX ORDER — NALBUPHINE HCL 10 MG/ML
2.5 AMPUL (ML) INJECTION
Status: DISCONTINUED | OUTPATIENT
Start: 2019-11-18 | End: 2019-11-19

## 2019-11-18 RX ORDER — DEXTROSE MONOHYDRATE 25 G/50ML
50 INJECTION, SOLUTION INTRAVENOUS
Status: DISCONTINUED | OUTPATIENT
Start: 2019-11-18 | End: 2019-11-19 | Stop reason: HOSPADM

## 2019-11-18 RX ORDER — EPHEDRINE SULFATE/0.9% NACL/PF 25 MG/5 ML
5 SYRINGE (ML) INTRAVENOUS AS NEEDED
Status: DISCONTINUED | OUTPATIENT
Start: 2019-11-18 | End: 2019-11-19

## 2019-11-18 RX ORDER — DEXTROSE, SODIUM CHLORIDE, SODIUM LACTATE, POTASSIUM CHLORIDE, AND CALCIUM CHLORIDE 5; .6; .31; .03; .02 G/100ML; G/100ML; G/100ML; G/100ML; G/100ML
INJECTION, SOLUTION INTRAVENOUS CONTINUOUS
Status: DISCONTINUED | OUTPATIENT
Start: 2019-11-18 | End: 2019-11-19 | Stop reason: HOSPADM

## 2019-11-18 RX ORDER — ONDANSETRON 2 MG/ML
4 INJECTION INTRAMUSCULAR; INTRAVENOUS EVERY 4 HOURS PRN
Status: DISCONTINUED | OUTPATIENT
Start: 2019-11-18 | End: 2019-11-19

## 2019-11-18 RX ORDER — DEXTROSE MONOHYDRATE 25 G/50ML
50 INJECTION, SOLUTION INTRAVENOUS AS NEEDED
Status: DISCONTINUED | OUTPATIENT
Start: 2019-11-18 | End: 2019-11-19 | Stop reason: HOSPADM

## 2019-11-18 RX ADMIN — BUPIVACAINE HYDROCHLORIDE 4 ML: 2.5 INJECTION, SOLUTION EPIDURAL; INFILTRATION; INTRACAUDAL at 14:02:00

## 2019-11-18 RX ADMIN — BUPIVACAINE HYDROCHLORIDE 3 ML: 2.5 INJECTION, SOLUTION EPIDURAL; INFILTRATION; INTRACAUDAL at 14:03:00

## 2019-11-18 NOTE — ANESTHESIA PROCEDURE NOTES
Labor Analgesia  Performed by: Kamron Schwab MD  Authorized by: Kamron Schwab MD       General Information and Staff    Start Time:  11/18/2019 2:01 PM  Anesthesiologist:  Kamron Schwab MD  Performed by:   Anesthesiologist  Patient Location:  OB  Site

## 2019-11-18 NOTE — PLAN OF CARE
Problem: SAFETY ADULT - FALL  Goal: Free from fall injury  Description  INTERVENTIONS:  - Assess pt frequently for physical needs  - Identify cognitive and physical deficits and behaviors that affect risk of falls.   - [place] fall precautions as indica

## 2019-11-18 NOTE — ANESTHESIA PREPROCEDURE EVALUATION
PRE-OP EVALUATION    Patient Name: Patience Reynolds    Pre-op Diagnosis: failure to progress in labor    C section    Pre-op vitals reviewed. Temp: 98.1 °F (36.7 °C)  Pulse: 71  Resp: 22  BP: 138/81  SpO2: 100 %  Body mass index is 40.04 kg/m².     Cur solution 30 mL, 30 mL, Oral, PRN  Zolpidem Tartrate (AMBIEN) tab 5 mg, 5 mg, Oral, Nightly PRN  [COMPLETED] dinoprostone (CERVIDIL) vaginal insert 10 mg, 10 mg, Vaginal, Once  Butorphanol Tartrate (STADOL) injection 1 mg, 1 mg, Intravenous, Q3H PRN  metFOR Endo/Other      (+) diabetes  gestational,                          Pulmonary                 (-) recent URI   (+) sleep apnea       Neuro/Psych      (+) depression                              Past Surgical History:   Procedure Laterality Date   • Augustus American backache.     Plan/risks discussed with: patient                Present on Admission:  **None**

## 2019-11-18 NOTE — H&P
[facility]    PATIENT'S NAME: Rivera Rhodes   ATTENDING PHYSICIAN: Jacques Muniz M.D.    PATIENT ACCOUNT#:   [de-identified]    LOCATION:  [address] [place] [address]  MEDICAL RECORD #:   [id number]       YOB: 1985  ADMISSION DATE: vitamins is once a day. ALLERGIES:  There are no known drug allergies that are known. SOCIAL HISTORY:  No smoking, no drug use or alcohol use.      PRENATAL LABORATORY:  Her prenatal labs are as follows:  Her CBC from April was hemoglobin 11.5 and he examination. 7.   Hypothyroid. On replacement. 8.   Obstructive sleep apnea. On CPAP. PLAN:  The plan for this patient is the following: We are inducing labor.   The patient was also given the option of an elective primary  section due to

## 2019-11-18 NOTE — PROGRESS NOTES
S:  patient very uncomfortable , she is requesting pain meds,   O: BP (!) 178/95   Pulse 61   Temp 98.1 °F (36.7 °C) (Axillary)   Resp 22   Ht 5' 2.99\" (1.6 m)   Wt 226 lb (102.5 kg)   BMI 40.04 kg/m²     Fetal status reassuring   Contractions every 2-3 mi

## 2019-11-18 NOTE — PLAN OF CARE
Problem: SAFETY ADULT - FALL  Goal: Free from fall injury  Description  INTERVENTIONS:  - Assess pt frequently for physical needs  - Identify cognitive and physical deficits and behaviors that affect risk of falls.   - [employer] fall precautions as indica

## 2019-11-19 PROCEDURE — 82962 GLUCOSE BLOOD TEST: CPT

## 2019-11-19 PROCEDURE — 88307 TISSUE EXAM BY PATHOLOGIST: CPT | Performed by: OBSTETRICS & GYNECOLOGY

## 2019-11-19 PROCEDURE — 94660 CPAP INITIATION&MGMT: CPT

## 2019-11-19 PROCEDURE — 5A09357 ASSISTANCE WITH RESPIRATORY VENTILATION, LESS THAN 24 CONSECUTIVE HOURS, CONTINUOUS POSITIVE AIRWAY PRESSURE: ICD-10-PCS | Performed by: OBSTETRICS & GYNECOLOGY

## 2019-11-19 RX ORDER — CARBOPROST TROMETHAMINE 250 UG/ML
INJECTION, SOLUTION INTRAMUSCULAR
Status: DISCONTINUED
Start: 2019-11-19 | End: 2019-11-19 | Stop reason: WASHOUT

## 2019-11-19 RX ORDER — CEFAZOLIN SODIUM/WATER 2 G/20 ML
SYRINGE (ML) INTRAVENOUS
Status: COMPLETED
Start: 2019-11-19 | End: 2019-11-19

## 2019-11-19 RX ORDER — DEXAMETHASONE SODIUM PHOSPHATE 4 MG/ML
VIAL (ML) INJECTION AS NEEDED
Status: DISCONTINUED | OUTPATIENT
Start: 2019-11-19 | End: 2019-11-19 | Stop reason: SURG

## 2019-11-19 RX ORDER — FAMOTIDINE 20 MG/1
20 TABLET ORAL ONCE
Status: DISCONTINUED | OUTPATIENT
Start: 2019-11-19 | End: 2019-11-19 | Stop reason: HOSPADM

## 2019-11-19 RX ORDER — HYDROCODONE BITARTRATE AND ACETAMINOPHEN 5; 325 MG/1; MG/1
1 TABLET ORAL EVERY 4 HOURS PRN
Status: DISCONTINUED | OUTPATIENT
Start: 2019-11-19 | End: 2019-11-21

## 2019-11-19 RX ORDER — NALBUPHINE HCL 10 MG/ML
2.5 AMPUL (ML) INJECTION EVERY 4 HOURS PRN
Status: DISCONTINUED | OUTPATIENT
Start: 2019-11-19 | End: 2019-11-21

## 2019-11-19 RX ORDER — FAMOTIDINE 10 MG/ML
20 INJECTION, SOLUTION INTRAVENOUS ONCE
Status: DISCONTINUED | OUTPATIENT
Start: 2019-11-19 | End: 2019-11-19 | Stop reason: HOSPADM

## 2019-11-19 RX ORDER — MISOPROSTOL 200 UG/1
TABLET ORAL
Status: DISPENSED
Start: 2019-11-19 | End: 2019-11-19

## 2019-11-19 RX ORDER — DEXTROSE MONOHYDRATE 25 G/50ML
50 INJECTION, SOLUTION INTRAVENOUS
Status: DISCONTINUED | OUTPATIENT
Start: 2019-11-19 | End: 2019-11-21

## 2019-11-19 RX ORDER — KETOROLAC TROMETHAMINE 30 MG/ML
30 INJECTION, SOLUTION INTRAMUSCULAR; INTRAVENOUS ONCE AS NEEDED
Status: ACTIVE | OUTPATIENT
Start: 2019-11-19 | End: 2019-11-19

## 2019-11-19 RX ORDER — KETOROLAC TROMETHAMINE 30 MG/ML
30 INJECTION, SOLUTION INTRAMUSCULAR; INTRAVENOUS EVERY 6 HOURS PRN
Status: DISPENSED | OUTPATIENT
Start: 2019-11-19 | End: 2019-11-20

## 2019-11-19 RX ORDER — SIMETHICONE 80 MG
80 TABLET,CHEWABLE ORAL 3 TIMES DAILY PRN
Status: DISCONTINUED | OUTPATIENT
Start: 2019-11-19 | End: 2019-11-21

## 2019-11-19 RX ORDER — SODIUM CHLORIDE, SODIUM LACTATE, POTASSIUM CHLORIDE, CALCIUM CHLORIDE 600; 310; 30; 20 MG/100ML; MG/100ML; MG/100ML; MG/100ML
INJECTION, SOLUTION INTRAVENOUS CONTINUOUS
Status: DISCONTINUED | OUTPATIENT
Start: 2019-11-19 | End: 2019-11-21

## 2019-11-19 RX ORDER — NALBUPHINE HCL 10 MG/ML
2.5 AMPUL (ML) INJECTION
Status: DISCONTINUED | OUTPATIENT
Start: 2019-11-19 | End: 2019-11-21

## 2019-11-19 RX ORDER — DIPHENHYDRAMINE HYDROCHLORIDE 50 MG/ML
25 INJECTION INTRAMUSCULAR; INTRAVENOUS ONCE AS NEEDED
Status: ACTIVE | OUTPATIENT
Start: 2019-11-19 | End: 2019-11-19

## 2019-11-19 RX ORDER — HYDROCODONE BITARTRATE AND ACETAMINOPHEN 10; 325 MG/1; MG/1
1 TABLET ORAL EVERY 4 HOURS PRN
Status: DISCONTINUED | OUTPATIENT
Start: 2019-11-19 | End: 2019-11-21

## 2019-11-19 RX ORDER — DEXTROSE, SODIUM CHLORIDE, SODIUM LACTATE, POTASSIUM CHLORIDE, AND CALCIUM CHLORIDE 5; .6; .31; .03; .02 G/100ML; G/100ML; G/100ML; G/100ML; G/100ML
INJECTION, SOLUTION INTRAVENOUS CONTINUOUS
Status: DISCONTINUED | OUTPATIENT
Start: 2019-11-19 | End: 2019-11-19

## 2019-11-19 RX ORDER — DIPHENHYDRAMINE HCL 25 MG
25 CAPSULE ORAL EVERY 4 HOURS PRN
Status: DISCONTINUED | OUTPATIENT
Start: 2019-11-19 | End: 2019-11-21

## 2019-11-19 RX ORDER — MORPHINE SULFATE 2 MG/ML
INJECTION, SOLUTION INTRAMUSCULAR; INTRAVENOUS AS NEEDED
Status: DISCONTINUED | OUTPATIENT
Start: 2019-11-19 | End: 2019-11-19 | Stop reason: SURG

## 2019-11-19 RX ORDER — METOCLOPRAMIDE 10 MG/1
10 TABLET ORAL ONCE
Status: COMPLETED | OUTPATIENT
Start: 2019-11-19 | End: 2019-11-19

## 2019-11-19 RX ORDER — METOCLOPRAMIDE HYDROCHLORIDE 5 MG/ML
10 INJECTION INTRAMUSCULAR; INTRAVENOUS ONCE
Status: COMPLETED | OUTPATIENT
Start: 2019-11-19 | End: 2019-11-19

## 2019-11-19 RX ORDER — IBUPROFEN 600 MG/1
600 TABLET ORAL EVERY 6 HOURS SCHEDULED
Status: DISCONTINUED | OUTPATIENT
Start: 2019-11-20 | End: 2019-11-21

## 2019-11-19 RX ORDER — ONDANSETRON 2 MG/ML
4 INJECTION INTRAMUSCULAR; INTRAVENOUS EVERY 6 HOURS PRN
Status: DISCONTINUED | OUTPATIENT
Start: 2019-11-19 | End: 2019-11-21

## 2019-11-19 RX ORDER — ONDANSETRON 2 MG/ML
4 INJECTION INTRAMUSCULAR; INTRAVENOUS ONCE AS NEEDED
Status: ACTIVE | OUTPATIENT
Start: 2019-11-19 | End: 2019-11-19

## 2019-11-19 RX ORDER — NALOXONE HYDROCHLORIDE 0.4 MG/ML
0.08 INJECTION, SOLUTION INTRAMUSCULAR; INTRAVENOUS; SUBCUTANEOUS
Status: ACTIVE | OUTPATIENT
Start: 2019-11-19 | End: 2019-11-20

## 2019-11-19 RX ORDER — HYDROMORPHONE HYDROCHLORIDE 1 MG/ML
0.4 INJECTION, SOLUTION INTRAMUSCULAR; INTRAVENOUS; SUBCUTANEOUS EVERY 2 HOUR PRN
Status: ACTIVE | OUTPATIENT
Start: 2019-11-19 | End: 2019-11-20

## 2019-11-19 RX ORDER — PHENYLEPHRINE HCL 10 MG/ML
VIAL (ML) INJECTION AS NEEDED
Status: DISCONTINUED | OUTPATIENT
Start: 2019-11-19 | End: 2019-11-19 | Stop reason: SURG

## 2019-11-19 RX ORDER — METOCLOPRAMIDE HYDROCHLORIDE 5 MG/ML
10 INJECTION INTRAMUSCULAR; INTRAVENOUS EVERY 6 HOURS PRN
Status: DISCONTINUED | OUTPATIENT
Start: 2019-11-19 | End: 2019-11-21

## 2019-11-19 RX ORDER — LIDOCAINE HYDROCHLORIDE AND EPINEPHRINE 20; 5 MG/ML; UG/ML
INJECTION, SOLUTION EPIDURAL; INFILTRATION; INTRACAUDAL; PERINEURAL AS NEEDED
Status: DISCONTINUED | OUTPATIENT
Start: 2019-11-19 | End: 2019-11-19 | Stop reason: SURG

## 2019-11-19 RX ORDER — ZOLPIDEM TARTRATE 5 MG/1
5 TABLET ORAL NIGHTLY PRN
Status: DISCONTINUED | OUTPATIENT
Start: 2019-11-19 | End: 2019-11-21

## 2019-11-19 RX ORDER — LEVOTHYROXINE SODIUM 0.05 MG/1
50 TABLET ORAL
Status: DISCONTINUED | OUTPATIENT
Start: 2019-11-19 | End: 2019-11-21

## 2019-11-19 RX ORDER — DOCUSATE SODIUM 100 MG/1
100 CAPSULE, LIQUID FILLED ORAL
Status: DISCONTINUED | OUTPATIENT
Start: 2019-11-20 | End: 2019-11-21

## 2019-11-19 RX ORDER — DIPHENHYDRAMINE HYDROCHLORIDE 50 MG/ML
12.5 INJECTION INTRAMUSCULAR; INTRAVENOUS EVERY 4 HOURS PRN
Status: DISCONTINUED | OUTPATIENT
Start: 2019-11-19 | End: 2019-11-21

## 2019-11-19 RX ORDER — BISACODYL 10 MG
10 SUPPOSITORY, RECTAL RECTAL
Status: DISCONTINUED | OUTPATIENT
Start: 2019-11-19 | End: 2019-11-21

## 2019-11-19 RX ADMIN — DEXAMETHASONE SODIUM PHOSPHATE 4 MG: 4 MG/ML VIAL (ML) INJECTION at 01:25:00

## 2019-11-19 RX ADMIN — MORPHINE SULFATE 4 MG: 2 INJECTION, SOLUTION INTRAMUSCULAR; INTRAVENOUS at 01:21:00

## 2019-11-19 RX ADMIN — ONDANSETRON 4 MG: 2 INJECTION INTRAMUSCULAR; INTRAVENOUS at 01:10:00

## 2019-11-19 RX ADMIN — SODIUM CHLORIDE, SODIUM LACTATE, POTASSIUM CHLORIDE, CALCIUM CHLORIDE: 600; 310; 30; 20 INJECTION, SOLUTION INTRAVENOUS at 01:00:00

## 2019-11-19 RX ADMIN — METOCLOPRAMIDE HYDROCHLORIDE 10 MG: 5 INJECTION INTRAMUSCULAR; INTRAVENOUS at 01:10:00

## 2019-11-19 RX ADMIN — PHENYLEPHRINE HCL 100 MCG: 10 MG/ML VIAL (ML) INJECTION at 01:24:00

## 2019-11-19 RX ADMIN — LIDOCAINE HYDROCHLORIDE AND EPINEPHRINE 10 ML: 20; 5 INJECTION, SOLUTION EPIDURAL; INFILTRATION; INTRACAUDAL; PERINEURAL at 01:04:00

## 2019-11-19 RX ADMIN — LIDOCAINE HYDROCHLORIDE AND EPINEPHRINE 10 ML: 20; 5 INJECTION, SOLUTION EPIDURAL; INFILTRATION; INTRACAUDAL; PERINEURAL at 01:02:00

## 2019-11-19 RX ADMIN — LIDOCAINE HYDROCHLORIDE AND EPINEPHRINE 5 ML: 20; 5 INJECTION, SOLUTION EPIDURAL; INFILTRATION; INTRACAUDAL; PERINEURAL at 01:38:00

## 2019-11-19 NOTE — PLAN OF CARE
NURSING ADMISSION NOTE  Patient admitted via cart  Oriented to room. Safety precautions initiated. Bed in low position. Call light in reach.   Binder applied  Tolerating ice chips

## 2019-11-19 NOTE — PROGRESS NOTES
Patient transferred to mother/baby room 2216 per cart in stable condition with baby and personal belongings. Accompanied by  and staff. Report given to mother/baby RN Ni Gardner.

## 2019-11-19 NOTE — PROGRESS NOTES
Report given to Genevieve Clifford RN d/t shift change. Pt received in report alert and oriented and resting in bed. Pt denies any pain or discomfort as of this time. Pt now awaiting cardio consult. Nursing care ongoing and safety maintained.

## 2019-11-19 NOTE — PROGRESS NOTES
S:  patient feeling rectal pressure while on her side  O: Temp:  [97.3 °F (36.3 °C)-98.6 °F (37 °C)] 98.6 °F (37 °C)  Pulse:  [57-91] 76  Resp:  [18-22] 18  BP: (116-178)/(58-98) 138/71     Fetal status reassuring    Pelvic: 8-9/522/-4 cephalic    IUPC plac

## 2019-11-19 NOTE — PROGRESS NOTES
Patient examined and she is 6-7 cm/100/-2 strip is reactive. We disucssed option of proceeding to  currently due to protracted active phase and slow progress. She is on 28 mu of pitocin.  Given that she has a suspected larger baby I suggested that s

## 2019-11-19 NOTE — ANESTHESIA POSTPROCEDURE EVALUATION
[address] Patient Status:  Inpatient   Age/Gender 27year old female MRN [id number]   Location 18[address] [facility] Attending [address], [facility] Jase Echevarria Triplett 647 Day # 2 PCP Ninoska Cuellar DO       Anesthesia Post-op

## 2019-11-19 NOTE — PROGRESS NOTES
Pt assisted up to BR. Sat on toilet for pericare. Egg-sized clot noted on pad with small lochia. Pt tolerated up well.

## 2019-11-19 NOTE — BRIEF OP NOTE
Pre-Operative Diagnosis: iup at 38 weeks, FTP, gestational diabetes, preclampsia     Post-Operative Diagnosis: same      Procedure Performed: LTCS  Procedure(s):      Surgeon(s) and Role:     * Uzma Savage MD, MD - Primary     * Justo Sanchez MD - Ass

## 2019-11-19 NOTE — CM/SW NOTE
SW attempted to see pt to assess due to EPDS of 8. Pt declined SW visit at this time. SW will meet with pt on Thursday 11/21/19. RN, Ronak Tinajero updated.     Prabhakar Fields MSW, LCSW   for [address] at [facility]  Ph: [phone]

## 2019-11-19 NOTE — PROGRESS NOTES
PT ASSISTED UP TO BR. SAT ON TOILET FOR DEBORAH CARE. MOVING WELL. PT WASHED UP AT SINK. PT SITTING UP IN CHAIR AT BEDSIDE.

## 2019-11-19 NOTE — PLAN OF CARE
Problem: Patient/Family Goals  Goal: Patient/Family Long Term Goal  Description  Patient's Long Term Goal:adequate pain control    Interventions:  -   - See additional Care Plan goals for specific interventions   Outcome: Completed  Goal: Patient/Family Monitor Blood Glucose as ordered  - Assess for signs and symptoms of hyperglycemia and hypoglycemia  - Administer ordered medications to maintain glucose within target range  - Assess barriers to adequate nutritional intake and initiate nutrition consult a

## 2019-11-19 NOTE — OPERATIVE REPORT
[facility]    PATIENT'S NAME: Rivera Carmelo   ATTENDING PHYSICIAN: Jacques Muniz M.D. OPERATING PHYSICIAN: Bere Lyons M.D.    PATIENT ACCOUNT#:   [de-identified]    LOCATION:  [address] [place] [address]  MEDICAL RECORD #:   [id number]       DA protracted active phase, decision was made for her to proceed to  section due to suspected macrosomia with diabetes and slow progress in labor.   Risks, benefits, and alternatives of the procedure were discussed with her in detail, and she verbalize then a second imbricating layer was placed, and then portions of the incision were reinforced with a third layer. Of note is that she did have a significant bleeder.   The branch of the uterine artery was on the left that was bleeding, and careful suturing

## 2019-11-19 NOTE — PROGRESS NOTES
[facility] 2SW-J n    [address] Patient Status:  Inpatient   Age/Gender 27year old female MRN [id number]   [facility] 2SW-J Attending [facility] [place] [address], Elias Son [address] Day # 2 PCP Yehuda Cherry, DO      Anesthesia Ulices Hester

## 2019-11-19 NOTE — PLAN OF CARE
Problem: Patient/Family Goals  Goal: Patient/Family Long Term Goal  Description  Patient's Long Term Goal:adequate pain control    Interventions:  -   - See additional Care Plan goals for specific interventions   Outcome: Progressing  Goal: Patient/Famil range  Description  INTERVENTIONS:  - Monitor Blood Glucose as ordered  - Assess for signs and symptoms of hyperglycemia and hypoglycemia  - Administer ordered medications to maintain glucose within target range  - Assess barriers to adequate nutritional i

## 2019-11-20 PROCEDURE — 85025 COMPLETE CBC W/AUTO DIFF WBC: CPT | Performed by: OBSTETRICS & GYNECOLOGY

## 2019-11-20 PROCEDURE — 80053 COMPREHEN METABOLIC PANEL: CPT | Performed by: OBSTETRICS & GYNECOLOGY

## 2019-11-20 PROCEDURE — 82962 GLUCOSE BLOOD TEST: CPT

## 2019-11-20 PROCEDURE — 84550 ASSAY OF BLOOD/URIC ACID: CPT | Performed by: OBSTETRICS & GYNECOLOGY

## 2019-11-20 RX ORDER — CALCIUM CARBONATE 200(500)MG
1000 TABLET,CHEWABLE ORAL 2 TIMES DAILY PRN
Status: DISCONTINUED | OUTPATIENT
Start: 2019-11-20 | End: 2019-11-21

## 2019-11-20 NOTE — PLAN OF CARE
Doing well, started PO meds, voiding, tolerating reg diet.  Baby in nursery part of noc and pt got some sleep

## 2019-11-20 NOTE — RESPIRATORY THERAPY NOTE
ALYSSA - Equipment Use Daily Summary:  · Set Mode   · Usage in hours:   · 90% Pressure (EPAP) level:   · 90% Insp Pressure (IPAP):   · AHI:   · Supplemental Oxygen:   · Comments:

## 2019-11-20 NOTE — PROGRESS NOTES
[facility] 2SW-J n    [address] Patient Status:  Inpatient   Age/Gender 27year old female MRN [id number]   [facility] 2SW-J Attending [facility] Day # 3 PCP Lucia Wyatt, DO      Anesthesia Balta Savage

## 2019-11-20 NOTE — PROGRESS NOTES
POD1    S: doing well, passing flatus, bleeding minimal, pain ok with motrin, breastfeeding  O: /65   Pulse 84   Temp 98.4 °F (36.9 °C) (Oral)   Resp 18   Ht 5' 2.99\" (1.6 m)   Wt 226 lb (102.5 kg)   SpO2 99%   Breastfeeding Yes   BMI 40.04 kg/m²

## 2019-11-20 NOTE — PROGRESS NOTES
C/O swelling in lower thigh area, no reddness, tenderness, warmth. Has been up in room and walking a lot today. Will rest in bed w/ legs elevated, will notify us if symptom persist,worsen or new ones develop.

## 2019-11-21 VITALS
OXYGEN SATURATION: 98 % | TEMPERATURE: 98 F | BODY MASS INDEX: 40.04 KG/M2 | WEIGHT: 226 LBS | DIASTOLIC BLOOD PRESSURE: 93 MMHG | RESPIRATION RATE: 18 BRPM | HEIGHT: 62.99 IN | HEART RATE: 83 BPM | SYSTOLIC BLOOD PRESSURE: 134 MMHG

## 2019-11-21 PROBLEM — O24.419 GESTATIONAL DIABETES MELLITUS (GDM) IN THIRD TRIMESTER: Status: RESOLVED | Noted: 2019-09-20 | Resolved: 2019-11-21

## 2019-11-21 PROBLEM — O09.813 HIGH RISK PREGNANCY DUE TO ASSISTED REPRODUCTIVE TECHNOLOGY IN THIRD TRIMESTER: Status: RESOLVED | Noted: 2019-09-15 | Resolved: 2019-11-21

## 2019-11-21 PROBLEM — O99.213 OBESITY AFFECTING PREGNANCY IN THIRD TRIMESTER: Status: RESOLVED | Noted: 2019-11-15 | Resolved: 2019-11-21

## 2019-11-21 PROCEDURE — 82947 ASSAY GLUCOSE BLOOD QUANT: CPT | Performed by: OBSTETRICS & GYNECOLOGY

## 2019-11-21 PROCEDURE — 82728 ASSAY OF FERRITIN: CPT | Performed by: OBSTETRICS & GYNECOLOGY

## 2019-11-21 PROCEDURE — 82962 GLUCOSE BLOOD TEST: CPT

## 2019-11-21 PROCEDURE — 85025 COMPLETE CBC W/AUTO DIFF WBC: CPT | Performed by: OBSTETRICS & GYNECOLOGY

## 2019-11-21 RX ORDER — HYDROCODONE BITARTRATE AND ACETAMINOPHEN 5; 325 MG/1; MG/1
1 TABLET ORAL EVERY 6 HOURS PRN
Qty: 20 TABLET | Refills: 0 | Status: SHIPPED | OUTPATIENT
Start: 2019-11-21 | End: 2020-01-15

## 2019-11-21 RX ORDER — IBUPROFEN 600 MG/1
600 TABLET ORAL EVERY 6 HOURS SCHEDULED
Qty: 30 TABLET | Refills: 0 | Status: SHIPPED | OUTPATIENT
Start: 2019-11-21 | End: 2020-01-15

## 2019-11-21 NOTE — PROGRESS NOTES
[facility]  Post-Partum Caesarean Section Progress Note    Gary Pride Patient Status:  Inpatient    1985 MRN [id number]   [facility] 2SW-J Attending Yuly Horton Day # 4 PCP Karen Antonio DO     SUB

## 2019-11-21 NOTE — CM/SW NOTE
SW order placed to identify needs and provide support and services. Pt scored a 8 on the [place]  Depression Scale completed after delivery. Pt presents with bright affect and mood.  Pt states she has an active therapist. She has a hx of de

## 2019-11-23 ENCOUNTER — HOSPITAL ENCOUNTER (INPATIENT)
Facility: HOSPITAL | Age: 34
LOS: 2 days | Discharge: HOME | DRG: 776 | End: 2019-11-25
Attending: EMERGENCY MEDICINE | Admitting: OBSTETRICS & GYNECOLOGY
Payer: COMMERCIAL

## 2019-11-23 DIAGNOSIS — I10 HYPERTENSION, UNSPECIFIED TYPE: Primary | ICD-10-CM

## 2019-11-23 PROCEDURE — 96375 TX/PRO/DX INJ NEW DRUG ADDON: CPT

## 2019-11-23 PROCEDURE — 85610 PROTHROMBIN TIME: CPT | Performed by: EMERGENCY MEDICINE

## 2019-11-23 PROCEDURE — 85025 COMPLETE CBC W/AUTO DIFF WBC: CPT | Performed by: EMERGENCY MEDICINE

## 2019-11-23 PROCEDURE — 85384 FIBRINOGEN ACTIVITY: CPT | Performed by: EMERGENCY MEDICINE

## 2019-11-23 PROCEDURE — 85025 COMPLETE CBC W/AUTO DIFF WBC: CPT

## 2019-11-23 PROCEDURE — 84550 ASSAY OF BLOOD/URIC ACID: CPT | Performed by: EMERGENCY MEDICINE

## 2019-11-23 PROCEDURE — 96374 THER/PROPH/DIAG INJ IV PUSH: CPT

## 2019-11-23 PROCEDURE — 80053 COMPREHEN METABOLIC PANEL: CPT | Performed by: EMERGENCY MEDICINE

## 2019-11-23 PROCEDURE — 99285 EMERGENCY DEPT VISIT HI MDM: CPT

## 2019-11-23 PROCEDURE — 93010 ELECTROCARDIOGRAM REPORT: CPT

## 2019-11-23 PROCEDURE — 85730 THROMBOPLASTIN TIME PARTIAL: CPT | Performed by: EMERGENCY MEDICINE

## 2019-11-23 PROCEDURE — 93005 ELECTROCARDIOGRAM TRACING: CPT

## 2019-11-23 PROCEDURE — 96376 TX/PRO/DX INJ SAME DRUG ADON: CPT

## 2019-11-23 PROCEDURE — 81001 URINALYSIS AUTO W/SCOPE: CPT | Performed by: EMERGENCY MEDICINE

## 2019-11-23 PROCEDURE — 80053 COMPREHEN METABOLIC PANEL: CPT

## 2019-11-23 RX ORDER — METOCLOPRAMIDE HYDROCHLORIDE 5 MG/ML
10 INJECTION INTRAMUSCULAR; INTRAVENOUS ONCE
Status: COMPLETED | OUTPATIENT
Start: 2019-11-23 | End: 2019-11-23

## 2019-11-23 RX ORDER — HYDRALAZINE HYDROCHLORIDE 20 MG/ML
10 INJECTION INTRAMUSCULAR; INTRAVENOUS ONCE
Status: COMPLETED | OUTPATIENT
Start: 2019-11-23 | End: 2019-11-23

## 2019-11-23 RX ORDER — MELATONIN
325
Status: DISCONTINUED | OUTPATIENT
Start: 2019-11-24 | End: 2019-11-25

## 2019-11-23 RX ORDER — CHOLECALCIFEROL (VITAMIN D3) 25 MCG
1 TABLET,CHEWABLE ORAL DAILY
Status: DISCONTINUED | OUTPATIENT
Start: 2019-11-23 | End: 2019-11-25

## 2019-11-23 RX ORDER — NIFEDIPINE 30 MG/1
30 TABLET, EXTENDED RELEASE ORAL DAILY
Status: DISCONTINUED | OUTPATIENT
Start: 2019-11-23 | End: 2019-11-25

## 2019-11-23 RX ORDER — NIFEDIPINE 10 MG/1
CAPSULE ORAL
Status: DISCONTINUED
Start: 2019-11-23 | End: 2019-11-23 | Stop reason: WASHOUT

## 2019-11-23 RX ORDER — ACETAMINOPHEN 500 MG
1000 TABLET ORAL ONCE
Status: COMPLETED | OUTPATIENT
Start: 2019-11-23 | End: 2019-11-23

## 2019-11-23 RX ORDER — LABETALOL HYDROCHLORIDE 5 MG/ML
20 INJECTION, SOLUTION INTRAVENOUS ONCE
Status: COMPLETED | OUTPATIENT
Start: 2019-11-23 | End: 2019-11-23

## 2019-11-23 RX ORDER — SODIUM CHLORIDE, SODIUM LACTATE, POTASSIUM CHLORIDE, CALCIUM CHLORIDE 600; 310; 30; 20 MG/100ML; MG/100ML; MG/100ML; MG/100ML
INJECTION, SOLUTION INTRAVENOUS CONTINUOUS
Status: DISCONTINUED | OUTPATIENT
Start: 2019-11-23 | End: 2019-11-25

## 2019-11-23 RX ORDER — LABETALOL HYDROCHLORIDE 5 MG/ML
40 INJECTION, SOLUTION INTRAVENOUS ONCE
Status: DISCONTINUED | OUTPATIENT
Start: 2019-11-23 | End: 2019-11-23

## 2019-11-23 RX ORDER — NIFEDIPINE 20 MG/1
CAPSULE ORAL
Status: DISCONTINUED
Start: 2019-11-23 | End: 2019-11-23 | Stop reason: WASHOUT

## 2019-11-23 RX ORDER — IBUPROFEN 600 MG/1
600 TABLET ORAL EVERY 6 HOURS SCHEDULED
Status: DISCONTINUED | OUTPATIENT
Start: 2019-11-24 | End: 2019-11-25

## 2019-11-23 RX ORDER — DOCUSATE SODIUM 100 MG/1
100 CAPSULE, LIQUID FILLED ORAL DAILY
Status: DISCONTINUED | OUTPATIENT
Start: 2019-11-23 | End: 2019-11-25

## 2019-11-23 RX ORDER — DIPHENHYDRAMINE HYDROCHLORIDE 50 MG/ML
25 INJECTION INTRAMUSCULAR; INTRAVENOUS ONCE
Status: COMPLETED | OUTPATIENT
Start: 2019-11-23 | End: 2019-11-23

## 2019-11-23 NOTE — ED NOTES
Patient is four days post partum. 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 She is requesting she speaks to a crisis worker for resources.

## 2019-11-23 NOTE — DISCHARGE SUMMARY
[facility]    PATIENT'S NAME: Sherlyn Yu   ATTENDING PHYSICIAN: Jinny Millard M.D.    PATIENT ACCOUNT#:   [de-identified]    LOCATION:  [address] [place] [address]  MEDICAL RECORD #:   [id number]       YOB: 1985  ADMISSION DATE: ambulating, tolerating a regular diet. She will follow up in the office on 11/25/2019, for a blood pressure check. Her postop hemoglobin was noted to be 9. Her ferritin was noted to be 36.2, which was within normal range.   Her fasting glucose was noted

## 2019-11-24 PROCEDURE — 83735 ASSAY OF MAGNESIUM: CPT | Performed by: OBSTETRICS & GYNECOLOGY

## 2019-11-24 RX ORDER — ACETAMINOPHEN 500 MG
TABLET ORAL
Status: COMPLETED
Start: 2019-11-24 | End: 2019-11-24

## 2019-11-24 RX ORDER — CETIRIZINE HYDROCHLORIDE 10 MG/1
10 TABLET ORAL DAILY
Status: DISCONTINUED | OUTPATIENT
Start: 2019-11-24 | End: 2019-11-25

## 2019-11-24 RX ORDER — LEVOTHYROXINE SODIUM 0.05 MG/1
50 TABLET ORAL
Status: DISCONTINUED | OUTPATIENT
Start: 2019-11-24 | End: 2019-11-25

## 2019-11-24 RX ORDER — ACETAMINOPHEN 500 MG
1000 TABLET ORAL EVERY 6 HOURS PRN
Status: DISCONTINUED | OUTPATIENT
Start: 2019-11-24 | End: 2019-11-25

## 2019-11-24 NOTE — BH LEVEL OF CARE ASSESSMENT
Level of Care Assessment Note    General Questions  Why are you here?: Recurrent thoughts, \"Can't let myself go to sleep or stay asleep. Feels like she is abandoning him. \"  Precipitating Events: Patient had concerns about dying after having .  Weston Piper thoughts about wanting someone harmed or killed in the past 30 days?: No  Have you harmed someone or had thoughts about wanting someone harmed or killed further back than 30 days?: No  Current or Past Harm Toward Animals: No  History or Allegations of Inap and suicide attempts)  Bipolar Symptoms: No problems reported or observed  Sleep Pattern: Disturbed/interrupted sleep; Insomnia; Nightmares (comment)(last part of pregnancy, shivers in the middle of the night)  Number of Sleep Hours: (1-2 hours per night on has ever been? : No                                                           Support for Recovery  Is your living environment a supportive place for recovery?: Yes  Describe: supportive family     Withdrawal Symptoms  History of Withdrawal Symptoms: Serge Anxious;Depressed; Sadness;Stressed  Anxiety Level- SUSAN only:  Moderate  Appropriateness of Affect: Congruent to mood  Range of Affect: Normal  Stability of Affect: Stable  Attitude toward staff: Co-operative;Open  Speech  Rate of Speech: Appropriate  Flow o behavior; Environmental stress; History of trauma;Stressful life events or loss;Current/past psychiatric disorder  Protective Factors: dog, new mom, supportive famly    Motivational Stage of Change  Motivational Stage of Change: Pre-Contemplative    Level of

## 2019-11-24 NOTE — PROGRESS NOTES
I spoke with dr. Michelle Goldstein from the ED about [facility] and history reviewed. She will be admitted and placed on magnesium and procardia 30xl due to elevated bps. She is postop day 4 and I discussed her with dr. Nadia Parkinson this evening.  Given  That the patient has a

## 2019-11-24 NOTE — PLAN OF CARE
Problem: Patient/Family Goals  Goal: Patient/Family Long Term Goal  Description  Patient's Long Term Goal: Maintain blood pressure with in parameters    Interventions:  Bedrest  Administer antihypertensive medications as ordered  - See additional Care Pl about early infant feeding cues (e.g., rooting, lip smacking, sucking fingers/hand) versus late cue of crying.  - Discuss/demonstrate breast feeding aids (e.g., infant sling, nursing footstool/pillows, and breast pumps).   - Encourage mother/other family me

## 2019-11-24 NOTE — PROGRESS NOTES
Received pt from ER. Oriented to room. Call light within reach, POC reviewed, questions answered. FOB, mother, et baby in room.

## 2019-11-24 NOTE — ED PROVIDER NOTES
Patient Seen in: [facility] Emergency Department      History   Patient presents with:  Hypertension (cardiovascular)    Stated Complaint: htn, 4 days post partum    HPI    Is a pleasant 54-year-old female who is 4 days postpartum status post C-sect Frequency: 2-4 times a month      Drinks per session: 1 or 2      Binge frequency: Never      Comment: 1 drink/day; none w/ pregnancy    Drug use: Not Currently      Types: Cannabis             Review of Systems    Positive for stated complaint: htn, 4 day Abnormal; Notable for the following components:    WBC 11.3 (*)     RBC 2.96 (*)     HGB 8.9 (*)     HCT 26.4 (*)     Neutrophil Absolute Prelim 8.14 (*)     Neutrophil Absolute 8.14 (*)     All other components within normal limits   PROTHROMBIN TIME (PT) type  (primary encounter diagnosis)    Disposition:  Admit  11/23/2019  9:07 pm    [phone]    Go in 4 days  For blood pressure recheck by nurse        In 2 weeks          M

## 2019-11-24 NOTE — PROGRESS NOTES
S: patient complains of headache, slightly better than yesterday.  She denies visual changes or epigastric pain, lochia mild, she is pumping, she is complaining of anxiety  O: /85 (BP Location: Left arm)   Pulse 88   Temp 98.3 °F (36.8 °C) (Oral)   Re

## 2019-11-24 NOTE — ED NOTES
Pt received on bed, alert & coherent, assumed care for this pt. Placed on cardiac monitor. Mild headache still with elevated BP.  Calm & pleasant

## 2019-11-24 NOTE — H&P
[facility]    PATIENT'S NAME: Pauline Martinez   ATTENDING PHYSICIAN: Sheri Wetzel M.D.    PATIENT ACCOUNT#:   [de-identified]    LOCATION:  [address] [place] [address]  MEDICAL RECORD #:   [id number]       YOB: 1985  ADMISSION DATE:       11/23/ her.  She is also hypothyroid. PAST SURGICAL HISTORY:  Oral surgery in 2004. MEDICATIONS:  She is on her prenatal vitamin. She takes vitamin D. ALLERGIES:  There are no known drug allergies.       SOCIAL HISTORY:  No smoking, no alcohol, no drug hospital for consideration of medication, as she states her anxiety is significantly worse since the delivery of her baby. The plan has been explained to the patient and discussed with her, and she is aware and agrees.       Dictated By Lord Francisco M.D.

## 2019-11-25 VITALS
HEART RATE: 75 BPM | TEMPERATURE: 98 F | RESPIRATION RATE: 20 BRPM | OXYGEN SATURATION: 97 % | DIASTOLIC BLOOD PRESSURE: 82 MMHG | SYSTOLIC BLOOD PRESSURE: 141 MMHG

## 2019-11-25 RX ORDER — SERTRALINE HYDROCHLORIDE 25 MG/1
25 TABLET, FILM COATED ORAL DAILY
Qty: 30 TABLET | Refills: 0 | Status: SHIPPED | OUTPATIENT
Start: 2019-11-25 | End: 2020-01-15

## 2019-11-25 NOTE — BH PROGRESS NOTE
Maternal Mental Health  Writer met with patient and  Eula Brewer today for the purpose of insuring she has a comprehensive plan of care for emotional concerns post childbirth and subsequent hospitalization.   Medical record was reviewed to include SUSAN note

## 2019-11-25 NOTE — PROGRESS NOTES
[facility]  Post-Partum Caesarean Section Progress Note    Rozina Beardchayito Patient Status:  Inpatient    1985 MRN [id number]   Location [address] [facility] Attending Adis Hunter MD, MD   [facility] Day # 2 PCP Anay Washington DO

## 2019-11-25 NOTE — PROGRESS NOTES
Pt standing and pacing at bedside crying and in a panic that baby is not breastfeeding enough and has only had 4 wet diapers so far today. Pt states she cannot handle this stress and is tired and just wants the baby to eat.  Pt states she does not want to

## 2019-11-25 NOTE — PROGRESS NOTES
Psych [facility] counselor returned call, notified Dr. Marge Gaines will be notified of consult and see pt within the next 24 hours.

## 2019-11-25 NOTE — PROGRESS NOTES
Richi Quinones at bedside to evaluate and support pt, discuss POC and outpatient plan/management w/ pt and .

## 2019-11-25 NOTE — PROGRESS NOTES
fed infant 60 cc enfamil at 0930-infant tolerated well. Pt resting in bed. Denies pain or discomforts.

## 2019-11-25 NOTE — PLAN OF CARE
Problem: Patient/Family Goals  Goal: Patient/Family Long Term Goal  Description  Patient's Long Term Goal: Maintain blood pressure with in parameters    Interventions:  Bedrest  Administer antihypertensive medications as ordered  - See additional Care Pl about early infant feeding cues (e.g., rooting, lip smacking, sucking fingers/hand) versus late cue of crying.  - Discuss/demonstrate breast feeding aids (e.g., infant sling, nursing footstool/pillows, and breast pumps).   - Encourage mother/other family me COPING  Goal: Pt/Family able to verbalize concerns and demonstrate effective coping strategies  Description  INTERVENTIONS:  - Assist patient/family to identify coping skills, available support systems and cultural and spiritual values  - Provide emotional

## 2019-11-25 NOTE — CONSULTS
[facility]  Psychiatric Progress Note    Elonda First YOB: 1985   Age/Gender 27year old female MRN [id number]   Location [address] Attending Evelio Rapp MD, MD   [facility] Day # 2 PCP James Vasquez, Assessment:  Suicidal ideation: no suicidal ideation  Homicidal ideation: Denied    Objective    Appearance: unremarkable  Behavior: unremarkable  Gait and Station:  normal  Attitude: cooperative and consistent  Speech: normal rate, rhythm and volume  Mood 600 mg, Oral, 4 times per day  prenatal multivitamin plus DHA (PNV with DHA) cap 1 capsule, 1 capsule, Oral, Daily        Plan      Precautions: Orders Placed This Encounter      Seizure precautions Until Discontinued  Due to lack of any safety issues, pt

## 2019-11-25 NOTE — PROGRESS NOTES
Called to pt room by pt. Pt tearful and concerned about feeding baby and whether baby is \"getting enough\". States she's very concerned since baby has not had wet diaper since 1930 11/24.  She feels confused by feeding instructions and whether to breastfee

## 2019-11-26 ENCOUNTER — TELEPHONE (OUTPATIENT)
Dept: OBGYN UNIT | Facility: HOSPITAL | Age: 34
End: 2019-11-26

## 2019-11-26 NOTE — PAYOR COMM NOTE
--------------  ADMISSION REVIEW     Payor: ROBBIN LAUREANO  Subscriber #:  [id number]  Authorization Number: [id number]    Admit date: 11/23/19  Admit time: 2211       Patient Seen in: [facility] Emergency Department    History   Patient presents with:  H 71   Resp 11/23/19 1704 18   Temp 11/23/19 1704 98 °F (36.7 °C)   Temp src 11/23/19 2233 Axillary   SpO2 11/23/19 1704 98 %   O2 Device 11/23/19 1704 None (Room air)   Physical Exam  Alert and oriented patient appears no distress HEENT exam is normal lungs last night with complaints of a headache and elevated blood pressures in the 160s over 100s at home. She is status post  section approximately 4 to 5 days ago.   On admission to the emergency room, her blood pressure initially was 170 over in the 1 100s, but today, they are ranging 130s to 150s over 70s to 80s, and she is on Procardia 30 XL. LUNGS:  Clear to auscultation bilaterally. HEART:  Regular rate and rhythm. ABDOMEN:  Soft and nontender. Her incision appears to be healing well.   There 265.0         I/O     Lungs: CTA  Ht: RRR  Wound: minimal drainage on steristrips  No erythema or separation of incision  Ext: scds on   Refexes:+ 1  A: sp ltcs pod 6      Bps on admission in severe range      Preeclampsia- severe now on magnesium/ nifedip

## 2019-11-26 NOTE — PROGRESS NOTES
PT DOING WELL; WAS READMITTED POST DISCHARGE AND IS NOW HOME AND WAS STARTED ON BP MEDS.  HAS FOLLOW UP APPOINTMENT SCHEDULED;  INFANT DOING WELL AND HAS SEEN PEDIATRICIAN  PT DENIES S/S OF PPD  REMINDED OF [facility]

## 2019-11-26 NOTE — PROGRESS NOTES
Pt d/c ambulatory and accompanied by infant and  after written d/c instructions verbally explained and copy given to pt with verbalized understanding from pt and .

## 2019-11-30 ENCOUNTER — APPOINTMENT (OUTPATIENT)
Dept: LACTATION | Facility: HOSPITAL | Age: 34
End: 2019-11-30
Attending: OBSTETRICS & GYNECOLOGY
Payer: COMMERCIAL

## 2019-12-05 NOTE — DISCHARGE SUMMARY
[facility]    PATIENT'S NAME: Dylan Melissa   ATTENDING PHYSICIAN: John Merchant M.D.    PATIENT ACCOUNT#:   [de-identified]    LOCATION:  [address] [place] [address]  MEDICAL RECORD #:   [id number]       YOB: 1985  ADMISSION DATE:       11/23/ anxiety issues. She was seen by Alphonso Arnold as well. As she was doing well on hospital day #2, she was discharged home with precautions to call with any concerning blood pressure issues.     FOLLOWUP:  The patient was instructed to follow up in the offic

## 2019-12-17 RX ORDER — LEVOTHYROXINE SODIUM 0.05 MG/1
TABLET ORAL
Qty: 90 TABLET | Refills: 0 | Status: SHIPPED | OUTPATIENT
Start: 2019-12-17 | End: 2020-02-10

## 2019-12-17 NOTE — TELEPHONE ENCOUNTER
Last Ov: 9/25/19, AMS, F/U results  Upcoming appt: no upcoming appt  Last labs:  Magnesium 11/24/19  Last Rx: levothyroxine 50mcg, #90, 0R 10/1/19

## 2019-12-31 ENCOUNTER — APPOINTMENT (OUTPATIENT)
Dept: LACTATION | Facility: HOSPITAL | Age: 34
End: 2019-12-31
Attending: OBSTETRICS & GYNECOLOGY
Payer: COMMERCIAL

## 2020-01-08 ENCOUNTER — TELEPHONE (OUTPATIENT)
Dept: INTERNAL MEDICINE CLINIC | Facility: CLINIC | Age: 35
End: 2020-01-08

## 2020-01-15 ENCOUNTER — OFFICE VISIT (OUTPATIENT)
Dept: INTERNAL MEDICINE CLINIC | Facility: CLINIC | Age: 35
End: 2020-01-15
Payer: COMMERCIAL

## 2020-01-15 VITALS
HEIGHT: 62.99 IN | TEMPERATURE: 98 F | RESPIRATION RATE: 16 BRPM | DIASTOLIC BLOOD PRESSURE: 64 MMHG | SYSTOLIC BLOOD PRESSURE: 118 MMHG | WEIGHT: 192 LBS | BODY MASS INDEX: 34.02 KG/M2 | HEART RATE: 70 BPM

## 2020-01-15 DIAGNOSIS — R03.0 ELEVATED BLOOD PRESSURE READING WITHOUT DIAGNOSIS OF HYPERTENSION: Primary | ICD-10-CM

## 2020-01-15 PROCEDURE — 99214 OFFICE O/P EST MOD 30 MIN: CPT | Performed by: FAMILY MEDICINE

## 2020-01-15 RX ORDER — NIFEDIPINE 30 MG/1
TABLET, EXTENDED RELEASE ORAL
COMMUNITY
Start: 2019-12-18 | End: 2020-02-14

## 2020-01-16 PROBLEM — Z34.90 PREGNANCY: Status: RESOLVED | Noted: 2019-11-17 | Resolved: 2020-01-16

## 2020-01-16 NOTE — PROGRESS NOTES
HPI:    Patient ID: Jose Antonio Crawley is a 27year old female. HPI Here for f/u on BPs. Patient has 1 month old baby boy delivered via  due to failure to progress. Elevated blood pressures during delivery decreased after delivery.  Patient wa Cardiovascular: Normal rate, regular rhythm and normal heart sounds. Pulmonary/Chest: Effort normal and breath sounds normal.   Neurological: She is alert. Psychiatric: She has a normal mood and affect. Her behavior is normal.   Vitals reviewed.

## 2020-01-17 ENCOUNTER — APPOINTMENT (OUTPATIENT)
Dept: LACTATION | Facility: HOSPITAL | Age: 35
End: 2020-01-17
Attending: OBSTETRICS & GYNECOLOGY
Payer: COMMERCIAL

## 2020-01-23 LAB — AMB EXT GLUCOSE: 95 MG/DL

## 2020-01-24 ENCOUNTER — TELEPHONE (OUTPATIENT)
Dept: INTERNAL MEDICINE CLINIC | Facility: CLINIC | Age: 35
End: 2020-01-24

## 2020-01-24 NOTE — TELEPHONE ENCOUNTER
Please call patient to make f/u appt with me for lab results received from OB as recommended by them.

## 2020-01-24 NOTE — TELEPHONE ENCOUNTER
Fax received from [facility] dated 1/23/20  Glucose-fasting 95mg/dl  Abstracted and placed in AMS bin for review

## 2020-02-10 ENCOUNTER — PATIENT MESSAGE (OUTPATIENT)
Dept: INTERNAL MEDICINE CLINIC | Facility: CLINIC | Age: 35
End: 2020-02-10

## 2020-02-10 DIAGNOSIS — R03.0 ELEVATED BLOOD PRESSURE READING WITHOUT DIAGNOSIS OF HYPERTENSION: Primary | ICD-10-CM

## 2020-02-10 DIAGNOSIS — R03.0 ELEVATED BLOOD PRESSURE READING WITHOUT DIAGNOSIS OF HYPERTENSION: ICD-10-CM

## 2020-02-10 RX ORDER — NIFEDIPINE 30 MG/1
TABLET, FILM COATED, EXTENDED RELEASE ORAL
Qty: 90 TABLET | Refills: 0 | OUTPATIENT
Start: 2020-02-10

## 2020-02-10 RX ORDER — NIFEDIPINE 30 MG/1
30 TABLET, FILM COATED, EXTENDED RELEASE ORAL DAILY
Qty: 30 TABLET | Refills: 0 | Status: SHIPPED | OUTPATIENT
Start: 2020-02-10 | End: 2020-02-14

## 2020-02-10 RX ORDER — LEVOTHYROXINE SODIUM 0.05 MG/1
TABLET ORAL
Qty: 90 TABLET | Refills: 0 | Status: SHIPPED | OUTPATIENT
Start: 2020-02-10 | End: 2020-07-28

## 2020-02-10 NOTE — TELEPHONE ENCOUNTER
From: Tasha Velez  To: Rush Byrnes DO  Sent: 2/10/2020 10:19 AM CST  Subject: Prescription Question    Hi Dr. Nehemias Sams,     My bp average this last week was 115/76.      I am out of the procardia however and I can't get it refilled through the

## 2020-02-10 NOTE — TELEPHONE ENCOUNTER
ASSESSMENT/PLAN:   Elevated blood pressure reading without diagnosis of hypertension  (primary encounter diagnosis)  Reviewed hospital course and labs. Discussed options with patient. Ok to wean nifedipine over one week and continue checking BPs daily.  If

## 2020-02-10 NOTE — TELEPHONE ENCOUNTER
Last Ov: 1/15/20, AMS, BP f/u  Upcoming appt: no upcoming appt  Last labs:  Magnesium 11/24/19  Last Rx: levothyroxine 50mcg, #90, 0R 12/17/19

## 2020-02-12 ENCOUNTER — TELEPHONE (OUTPATIENT)
Dept: INTERNAL MEDICINE CLINIC | Facility: CLINIC | Age: 35
End: 2020-02-12

## 2020-02-12 ENCOUNTER — HOSPITAL ENCOUNTER (OUTPATIENT)
Age: 35
Discharge: HOME | End: 2020-02-12
Attending: FAMILY MEDICINE
Payer: COMMERCIAL

## 2020-02-12 VITALS
HEART RATE: 54 BPM | RESPIRATION RATE: 20 BRPM | OXYGEN SATURATION: 100 % | DIASTOLIC BLOOD PRESSURE: 88 MMHG | SYSTOLIC BLOOD PRESSURE: 141 MMHG

## 2020-02-12 DIAGNOSIS — R07.89 CHEST PAIN, ATYPICAL: Primary | ICD-10-CM

## 2020-02-12 LAB
#MXD IC: 0.9 X10ˆ3/UL (ref 0.1–1)
DDIMER WHOLE BLOOD: 343 NG/ML DDU (ref ?–400)
GLUCOSE BLD-MCNC: 91 MG/DL (ref 70–99)
HCT VFR BLD AUTO: 38.7 % (ref 35–48)
HGB BLD-MCNC: 12.7 G/DL (ref 12–16)
ISTAT TROPONIN: <0.1 NG/ML (ref ?–0.1)
LYMPHOCYTES # BLD AUTO: 3.4 X10ˆ3/UL (ref 1–4)
LYMPHOCYTES NFR BLD AUTO: 34.4 %
MCH RBC QN AUTO: 27.7 PG (ref 26–34)
MCHC RBC AUTO-ENTMCNC: 32.8 G/DL (ref 31–37)
MCV RBC AUTO: 84.5 FL (ref 80–100)
MIXED CELL %: 8.7 %
NEUTROPHILS # BLD AUTO: 5.5 X10ˆ3/UL (ref 1.5–7.7)
NEUTROPHILS NFR BLD AUTO: 56.9 %
PLATELET # BLD AUTO: 320 X10ˆ3/UL (ref 150–450)
RBC # BLD AUTO: 4.58 X10ˆ6/UL (ref 3.8–5.3)
WBC # BLD AUTO: 9.8 X10ˆ3/UL (ref 4–11)

## 2020-02-12 PROCEDURE — 84484 ASSAY OF TROPONIN QUANT: CPT

## 2020-02-12 PROCEDURE — 85025 COMPLETE CBC W/AUTO DIFF WBC: CPT | Performed by: FAMILY MEDICINE

## 2020-02-12 PROCEDURE — 93005 ELECTROCARDIOGRAM TRACING: CPT

## 2020-02-12 PROCEDURE — 82962 GLUCOSE BLOOD TEST: CPT

## 2020-02-12 PROCEDURE — 93010 ELECTROCARDIOGRAM REPORT: CPT

## 2020-02-12 PROCEDURE — 99214 OFFICE O/P EST MOD 30 MIN: CPT

## 2020-02-12 PROCEDURE — 36415 COLL VENOUS BLD VENIPUNCTURE: CPT

## 2020-02-12 PROCEDURE — 85378 FIBRIN DEGRADE SEMIQUANT: CPT | Performed by: FAMILY MEDICINE

## 2020-02-12 NOTE — TELEPHONE ENCOUNTER
Problems   This clinical information was not verified.    Medications   This clinical information was not verified, but there are updates pending review:   Reported Medications Start Date Reported By  Comments   Nexplanon 68 MG Impl 2/7/2020 [place]

## 2020-02-12 NOTE — TELEPHONE ENCOUNTER
NIFEdipine ER 30 MG Oral Tablet 24 Hr 30 tablet 0 2/10/2020    Sig:   Take 1 tablet (30 mg total) by mouth daily. Route:   Oral       Immediately after taking this medication she felt dizzy and her bp was low 90/56.  She hasn't taken the dose today as o

## 2020-02-12 NOTE — TELEPHONE ENCOUNTER
This RN spoke to pt. Pt states she took a dose yesterday and last night her BP's were ranging from 89/56-92/58 and was dizzy/lightheaded. Pt states today her BP has been 100/70.  Pt has 3 month baby at home so concerned about being lightheaded with little o

## 2020-02-13 LAB
ATRIAL RATE: 53 BPM
P AXIS: 35 DEGREES
P-R INTERVAL: 134 MS
Q-T INTERVAL: 434 MS
QRS DURATION: 86 MS
QTC CALCULATION (BEZET): 407 MS
R AXIS: 19 DEGREES
T AXIS: 19 DEGREES
VENTRICULAR RATE: 53 BPM

## 2020-02-13 NOTE — ED PROVIDER NOTES
Patient Seen in: [facility]      History   Patient presents with:  Chest Pain Angina    Stated Complaint: Lightheaded and chest pains    HPI    This 30-year-old female presents the office with complaint of left-sided chest disco 10-12-16    AHI 39 SaO2 marino 50 %; uses CPAP   • Pregnancy-induced hypertension 2019    being monitored as of 19   • Seasonal allergies               Past Surgical History:   Procedure Laterality Date   •  SECTION N/A 2019    Performed all four quadrants. EXTREMITIES: No edema noted. SKIN: Warm and dry. ED Course     Labs Reviewed   D-DIMER (POC) - Normal   POCT GLUCOSE - Normal   ISTAT TROPONIN - Normal   POCT CBC     EKG    Rate, intervals and axes as noted on EKG Report.   Rate:

## 2020-02-13 NOTE — ED NOTES
Pt states pain lasts maybe a couple minutes at a time, gets better at rest. Point tenderness that is reproduceable w palp. Pt not breast feeding. States had some lightheadedness yesterday and bp was low, she recently restarted nifedipine.

## 2020-02-13 NOTE — ED INITIAL ASSESSMENT (HPI)
Pt here w/ c/o being started back on nifedipine on Monday. Pt had been on it prior for preeclmapsia. Yesterday bp 89/56 so held med.  Tonight at [phone] started developing chest pressure, worse w/ exertion, improves at rest.

## 2020-02-14 ENCOUNTER — OFFICE VISIT (OUTPATIENT)
Dept: INTERNAL MEDICINE CLINIC | Facility: CLINIC | Age: 35
End: 2020-02-14
Payer: COMMERCIAL

## 2020-02-14 VITALS
RESPIRATION RATE: 16 BRPM | SYSTOLIC BLOOD PRESSURE: 108 MMHG | DIASTOLIC BLOOD PRESSURE: 58 MMHG | HEIGHT: 62.99 IN | HEART RATE: 92 BPM | BODY MASS INDEX: 34.02 KG/M2 | TEMPERATURE: 98 F | WEIGHT: 192 LBS

## 2020-02-14 DIAGNOSIS — I10 ESSENTIAL HYPERTENSION: Primary | ICD-10-CM

## 2020-02-14 DIAGNOSIS — Z86.32 HISTORY OF GESTATIONAL DIABETES MELLITUS (GDM): ICD-10-CM

## 2020-02-14 LAB
CARTRIDGE LOT#: 588 NUMERIC
HEMOGLOBIN A1C: 5.2 % (ref 4.3–5.6)

## 2020-02-14 PROCEDURE — 99214 OFFICE O/P EST MOD 30 MIN: CPT | Performed by: FAMILY MEDICINE

## 2020-02-14 PROCEDURE — 83036 HEMOGLOBIN GLYCOSYLATED A1C: CPT | Performed by: FAMILY MEDICINE

## 2020-02-14 RX ORDER — LISINOPRIL 5 MG/1
5 TABLET ORAL DAILY
Qty: 90 TABLET | Refills: 0 | Status: SHIPPED | OUTPATIENT
Start: 2020-02-14 | End: 2020-05-11

## 2020-02-14 NOTE — PROGRESS NOTES
HPI:    Patient ID: Mary Nunez is a 27year old female. HPI Here for f/u from stopping nifedipine.  Patient had been taking again after weaning off for a week, off completely for a week and then had elevated BPs with vomiting so restarted on 1/ cetirizine 10 MG Oral Tab Take 10 mg by mouth daily. Allergies:No Known Allergies   PHYSICAL EXAM:   Physical Exam   Constitutional: She appears well-developed and well-nourished. HENT:   Head: Normocephalic and atraumatic.    Pulmonary/Chest: Effor

## 2020-02-20 ENCOUNTER — PATIENT MESSAGE (OUTPATIENT)
Dept: INTERNAL MEDICINE CLINIC | Facility: CLINIC | Age: 35
End: 2020-02-20

## 2020-02-20 DIAGNOSIS — I10 ESSENTIAL HYPERTENSION: Primary | ICD-10-CM

## 2020-02-20 NOTE — TELEPHONE ENCOUNTER
From: Zia Hughes  To: Domitila Bullock DO  Sent: 2/20/2020 11:44 AM CST  Subject: Test Results Question    Hi Dr. Andrey Woodruff,    The average for my blood pressure this week was 116/76. Feeling pretty good on the medication.

## 2020-02-20 NOTE — TELEPHONE ENCOUNTER
Good, find out if she wants to stay on this medication or try weaning off of it. If she wants to stay on it, she should have her blood drawn (BMP) in one week.

## 2020-02-27 ENCOUNTER — APPOINTMENT (OUTPATIENT)
Dept: LAB | Age: 35
End: 2020-02-27
Attending: FAMILY MEDICINE
Payer: COMMERCIAL

## 2020-02-27 DIAGNOSIS — I10 ESSENTIAL HYPERTENSION: ICD-10-CM

## 2020-02-27 LAB
ANION GAP SERPL CALC-SCNC: 4 MMOL/L (ref 0–18)
BUN BLD-MCNC: 13 MG/DL (ref 7–18)
BUN/CREAT SERPL: 15.1 (ref 10–20)
CALCIUM BLD-MCNC: 9 MG/DL (ref 8.5–10.1)
CHLORIDE SERPL-SCNC: 110 MMOL/L (ref 98–112)
CO2 SERPL-SCNC: 27 MMOL/L (ref 21–32)
CREAT BLD-MCNC: 0.86 MG/DL (ref 0.55–1.02)
GLUCOSE BLD-MCNC: 97 MG/DL (ref 70–99)
OSMOLALITY SERPL CALC.SUM OF ELEC: 292 MOSM/KG (ref 275–295)
PATIENT FASTING Y/N/NP: NO
POTASSIUM SERPL-SCNC: 4.1 MMOL/L (ref 3.5–5.1)
SODIUM SERPL-SCNC: 141 MMOL/L (ref 136–145)

## 2020-02-27 PROCEDURE — 36415 COLL VENOUS BLD VENIPUNCTURE: CPT | Performed by: FAMILY MEDICINE

## 2020-02-27 PROCEDURE — 80048 BASIC METABOLIC PNL TOTAL CA: CPT | Performed by: FAMILY MEDICINE

## 2020-05-11 RX ORDER — LISINOPRIL 5 MG/1
TABLET ORAL
Qty: 90 TABLET | Refills: 0 | Status: SHIPPED | OUTPATIENT
Start: 2020-05-11 | End: 2020-07-28

## 2020-05-11 NOTE — TELEPHONE ENCOUNTER
Last OV: 2/14/20 BP f/u    Upcoming OV: no upcoming appt. Pt is over due for annual. Last PE was 4/2018     Latest labs: 2/27/20 BMP    Latest RX: LISINOPRIL 5MG TABLETS 90 tabs 0 refills on 2/14/20    Per protocol, passed. Rx pending.

## 2020-05-15 NOTE — PROGRESS NOTES
HPI:    Patient ID: Tasha Velez is a 27year old female. Virtual Telephone Check-In    The patient verbally consents to a Virtual/Telephone Check-In visit on 5/15/2020.     Patient understands and accepts financial responsibility for any deducti 2019    being monitored as of 11/1/19   • Seasonal allergies          Review of Systems   Constitutional: Negative for fatigue and unexpected weight change. Respiratory: Negative for chest tightness and shortness of breath.     Psychiatric/Behavioral: Pos This Visit:  Requested Prescriptions     Signed Prescriptions Disp Refills   • fluvoxaMINE Maleate 100 MG Oral Tab 90 tablet 0     Sig: Take 1 tablet (100 mg total) by mouth nightly.    • ALPRAZolam 0.5 MG Oral Tab 20 tablet 0     Sig: Take 1 tablet (0.5 mg

## 2020-05-28 ENCOUNTER — TELEMEDICINE (OUTPATIENT)
Dept: INTERNAL MEDICINE CLINIC | Facility: CLINIC | Age: 35
End: 2020-05-28
Payer: COMMERCIAL

## 2020-05-28 DIAGNOSIS — F41.9 ANXIETY: ICD-10-CM

## 2020-05-28 DIAGNOSIS — T14.8XXA BLOOD BLISTER: Primary | ICD-10-CM

## 2020-05-28 PROCEDURE — 99213 OFFICE O/P EST LOW 20 MIN: CPT | Performed by: FAMILY MEDICINE

## 2020-05-28 NOTE — PROGRESS NOTES
HPI:    Patient ID: Michael Carrera is a 27year old female. This visit is conducted using Telemedicine with live, interactive video and audio.      Please note that the following visit was completed using two-way, real-time interactive video commun Maleate (LUVOX CR OR) Take by mouth. • LEVOTHYROXINE SODIUM 50 MCG Oral Tab TAKE 1 TABLET(50 MCG) BY MOUTH BEFORE BREAKFAST 90 tablet 0   • Calcium Carbonate Antacid 500 MG Oral Chew Tab Chew 1 tablet by mouth daily.      • cetirizine 10 MG Oral Tab [facility]

## 2020-07-01 ENCOUNTER — TELEPHONE (OUTPATIENT)
Dept: INTERNAL MEDICINE CLINIC | Facility: CLINIC | Age: 35
End: 2020-07-01

## 2020-07-01 RX ORDER — FLUVOXAMINE MALEATE 100 MG
100 TABLET ORAL 2 TIMES DAILY
Qty: 180 TABLET | Refills: 0 | Status: SHIPPED | OUTPATIENT
Start: 2020-07-01 | End: 2020-09-29

## 2020-07-01 NOTE — TELEPHONE ENCOUNTER
Please call patient to let her know I sent BID dosing to her pharmacy. She should let us know how she is doing in 2 weeks.

## 2020-07-01 NOTE — TELEPHONE ENCOUNTER
Informed patient of AMS notes. Pt agreed to let us know how she is feeling in 2 weeks. Pt verbalized understanding. Closing encounter.

## 2020-07-01 NOTE — TELEPHONE ENCOUNTER
Please call patient to let her know- see patient message from 6/26/2020- she had thought it was going to be covered by insurance. Does she want to do the BID dosing if the ER is not covered?

## 2020-07-01 NOTE — TELEPHONE ENCOUNTER
Spoke to pharmacy to confirm ER is not covered. Spoke to Patient and states BID is ok since ER is not covered.      FWD to AMS

## 2020-07-01 NOTE — TELEPHONE ENCOUNTER
Received fax form [employer] stating that Fluvoxamine 150mg ER is not covered by insurance, pharmacy is stating that we change to something else or contact insurance to see if a prior Heike Sims is even allowed, please advise.

## 2020-07-28 RX ORDER — LISINOPRIL 5 MG/1
TABLET ORAL
Qty: 90 TABLET | Refills: 0 | Status: SHIPPED | OUTPATIENT
Start: 2020-07-28 | End: 2020-11-11

## 2020-07-28 RX ORDER — LEVOTHYROXINE SODIUM 0.05 MG/1
TABLET ORAL
Qty: 90 TABLET | Refills: 0 | Status: SHIPPED | OUTPATIENT
Start: 2020-07-28 | End: 2020-11-11

## 2020-07-28 NOTE — TELEPHONE ENCOUNTER
Last Ov:2/14/20  Upcoming appt:none  Last labs:2/27/20 BMP  Last Rx:5/11/20 lisinopril 5mg, 2/10/20 levothyroxine sodium 50mcg    Per Protocol sent for review

## 2020-09-28 ENCOUNTER — PATIENT MESSAGE (OUTPATIENT)
Dept: INTERNAL MEDICINE CLINIC | Facility: CLINIC | Age: 35
End: 2020-09-28

## 2020-09-28 DIAGNOSIS — Z13.220 SCREENING, LIPID: ICD-10-CM

## 2020-09-28 DIAGNOSIS — E03.8 SUBCLINICAL HYPOTHYROIDISM: Primary | ICD-10-CM

## 2020-09-29 NOTE — TELEPHONE ENCOUNTER
From: Shirley Cuevas  To: Jannet Beard DO  Sent: 9/28/2020 3:50 PM CDT  Subject: Prescription Question    Hi Dr. Julius Mckeon,    Is excessive sweating a common side effect for any of the medication I'm taking?  I've noticed over the past month that I

## 2020-10-15 ENCOUNTER — TELEPHONE (OUTPATIENT)
Dept: INTERNAL MEDICINE CLINIC | Facility: CLINIC | Age: 35
End: 2020-10-15

## 2020-10-15 NOTE — TELEPHONE ENCOUNTER
Received and abstracted flu vaccine documentation from [address] [place] [address]. Placed in AMS bin to review.

## 2020-11-12 RX ORDER — FLUVOXAMINE MALEATE 100 MG
100 TABLET ORAL 2 TIMES DAILY
Qty: 180 TABLET | Refills: 0 | Status: SHIPPED | OUTPATIENT
Start: 2020-11-12 | End: 2021-02-03

## 2020-11-12 RX ORDER — LISINOPRIL 5 MG/1
5 TABLET ORAL DAILY
Qty: 90 TABLET | Refills: 0 | Status: SHIPPED | OUTPATIENT
Start: 2020-11-12 | End: 2021-02-03

## 2020-11-12 RX ORDER — LEVOTHYROXINE SODIUM 0.05 MG/1
50 TABLET ORAL
Qty: 90 TABLET | Refills: 0 | Status: SHIPPED | OUTPATIENT
Start: 2020-11-12 | End: 2021-02-03

## 2020-11-12 NOTE — TELEPHONE ENCOUNTER
Fax received from [employer]  Requesting:  Levothyroxine, lisinopril and fluvoxamine     LOV: 2/14/20, essential hypertension, AMS  Last labs: bmp, 2/27/20, Cbc 11/23/19    Last refill:   Levothyroxine 50 mcg, 7/28/20, 90 tablet, 0 refills  Lisino

## 2021-02-02 DIAGNOSIS — Z13.220 SCREENING, LIPID: Primary | ICD-10-CM

## 2021-02-02 DIAGNOSIS — Z86.32 HISTORY OF GESTATIONAL DIABETES MELLITUS (GDM): ICD-10-CM

## 2021-02-02 DIAGNOSIS — Z13.1 SCREENING FOR DIABETES MELLITUS: ICD-10-CM

## 2021-02-02 DIAGNOSIS — Z13.0 SCREENING FOR DEFICIENCY ANEMIA: ICD-10-CM

## 2021-02-02 DIAGNOSIS — Z13.29 SCREENING FOR THYROID DISORDER: ICD-10-CM

## 2021-02-03 RX ORDER — LISINOPRIL 5 MG/1
TABLET ORAL
Qty: 90 TABLET | Refills: 0 | Status: SHIPPED | OUTPATIENT
Start: 2021-02-03 | End: 2021-05-06

## 2021-02-03 RX ORDER — LEVOTHYROXINE SODIUM 0.05 MG/1
TABLET ORAL
Qty: 90 TABLET | Refills: 0 | Status: SHIPPED | OUTPATIENT
Start: 2021-02-03 | End: 2021-05-10

## 2021-02-03 RX ORDER — FLUVOXAMINE MALEATE 100 MG
TABLET ORAL
Qty: 180 TABLET | Refills: 0 | Status: SHIPPED | OUTPATIENT
Start: 2021-02-03 | End: 2021-05-10

## 2021-02-03 NOTE — TELEPHONE ENCOUNTER
LOV:5/15/20,anxiety/virtual, AMS  Last CPE:4/11/18, CPE, AMS  Last refill:  Levothyroxine ,11/12/20 #90tablet, 0 refills  lisinopril 5 MG Oral Tab,11/12/20 #90tablet, 0 refills  Last labs that are related: bmp 2/27/20, needs to complete other labs  Future

## 2021-02-03 NOTE — TELEPHONE ENCOUNTER
Last Ov:5/15/20  Upcoming appt:none  Last labs:no recent  Last Rx:11/12/20 fluvoxamine maleate 100mg    Per Protocol sent for review

## 2021-02-03 NOTE — TELEPHONE ENCOUNTER
Please call patient to schedule annual check-up. Fasting lab orders are at [facility].

## 2021-02-19 ENCOUNTER — LAB ENCOUNTER (OUTPATIENT)
Dept: LAB | Age: 36
End: 2021-02-19
Attending: FAMILY MEDICINE
Payer: COMMERCIAL

## 2021-02-19 DIAGNOSIS — Z86.32 HISTORY OF GESTATIONAL DIABETES MELLITUS (GDM): ICD-10-CM

## 2021-02-19 DIAGNOSIS — Z13.220 SCREENING, LIPID: ICD-10-CM

## 2021-02-19 DIAGNOSIS — Z13.29 SCREENING FOR THYROID DISORDER: ICD-10-CM

## 2021-02-19 DIAGNOSIS — Z13.0 SCREENING FOR DEFICIENCY ANEMIA: ICD-10-CM

## 2021-02-19 LAB
ALBUMIN SERPL-MCNC: 3.4 G/DL (ref 3.4–5)
ALBUMIN/GLOB SERPL: 0.9 {RATIO} (ref 1–2)
ALP LIVER SERPL-CCNC: 65 U/L
ALT SERPL-CCNC: 33 U/L
ANION GAP SERPL CALC-SCNC: 7 MMOL/L (ref 0–18)
AST SERPL-CCNC: 27 U/L (ref 15–37)
BASOPHILS # BLD AUTO: 0.03 X10(3) UL (ref 0–0.2)
BASOPHILS NFR BLD AUTO: 0.4 %
BILIRUB SERPL-MCNC: 0.3 MG/DL (ref 0.1–2)
BUN BLD-MCNC: 16 MG/DL (ref 7–18)
BUN/CREAT SERPL: 21.1 (ref 10–20)
CALCIUM BLD-MCNC: 8.7 MG/DL (ref 8.5–10.1)
CHLORIDE SERPL-SCNC: 109 MMOL/L (ref 98–112)
CHOLEST SMN-MCNC: 160 MG/DL (ref ?–200)
CO2 SERPL-SCNC: 23 MMOL/L (ref 21–32)
CREAT BLD-MCNC: 0.76 MG/DL
DEPRECATED RDW RBC AUTO: 41.1 FL (ref 35.1–46.3)
EOSINOPHIL # BLD AUTO: 0.19 X10(3) UL (ref 0–0.7)
EOSINOPHIL NFR BLD AUTO: 2.8 %
ERYTHROCYTE [DISTWIDTH] IN BLOOD BY AUTOMATED COUNT: 13.2 % (ref 11–15)
EST. AVERAGE GLUCOSE BLD GHB EST-MCNC: 120 MG/DL (ref 68–126)
GLOBULIN PLAS-MCNC: 3.8 G/DL (ref 2.8–4.4)
GLUCOSE BLD-MCNC: 102 MG/DL (ref 70–99)
HBA1C MFR BLD HPLC: 5.8 % (ref ?–5.7)
HCT VFR BLD AUTO: 39.8 %
HDLC SERPL-MCNC: 62 MG/DL (ref 40–59)
HGB BLD-MCNC: 13.5 G/DL
IMM GRANULOCYTES # BLD AUTO: 0.02 X10(3) UL (ref 0–1)
IMM GRANULOCYTES NFR BLD: 0.3 %
LDLC SERPL CALC-MCNC: 76 MG/DL (ref ?–100)
LYMPHOCYTES # BLD AUTO: 2.21 X10(3) UL (ref 1–4)
LYMPHOCYTES NFR BLD AUTO: 32.1 %
M PROTEIN MFR SERPL ELPH: 7.2 G/DL (ref 6.4–8.2)
MCH RBC QN AUTO: 29.4 PG (ref 26–34)
MCHC RBC AUTO-ENTMCNC: 33.9 G/DL (ref 31–37)
MCV RBC AUTO: 86.7 FL
MONOCYTES # BLD AUTO: 0.63 X10(3) UL (ref 0.1–1)
MONOCYTES NFR BLD AUTO: 9.2 %
NEUTROPHILS # BLD AUTO: 3.8 X10 (3) UL (ref 1.5–7.7)
NEUTROPHILS # BLD AUTO: 3.8 X10(3) UL (ref 1.5–7.7)
NEUTROPHILS NFR BLD AUTO: 55.2 %
NONHDLC SERPL-MCNC: 98 MG/DL (ref ?–130)
OSMOLALITY SERPL CALC.SUM OF ELEC: 289 MOSM/KG (ref 275–295)
PATIENT FASTING Y/N/NP: YES
PATIENT FASTING Y/N/NP: YES
PLATELET # BLD AUTO: 317 10(3)UL (ref 150–450)
POTASSIUM SERPL-SCNC: 4.1 MMOL/L (ref 3.5–5.1)
RBC # BLD AUTO: 4.59 X10(6)UL
SODIUM SERPL-SCNC: 139 MMOL/L (ref 136–145)
TRIGL SERPL-MCNC: 111 MG/DL (ref 30–149)
TSI SER-ACNC: 0.78 MIU/ML (ref 0.36–3.74)
VLDLC SERPL CALC-MCNC: 22 MG/DL (ref 0–30)
WBC # BLD AUTO: 6.9 X10(3) UL (ref 4–11)

## 2021-02-19 PROCEDURE — 84443 ASSAY THYROID STIM HORMONE: CPT

## 2021-02-19 PROCEDURE — 36415 COLL VENOUS BLD VENIPUNCTURE: CPT

## 2021-02-19 PROCEDURE — 85025 COMPLETE CBC W/AUTO DIFF WBC: CPT

## 2021-02-19 PROCEDURE — 80053 COMPREHEN METABOLIC PANEL: CPT

## 2021-02-19 PROCEDURE — 83036 HEMOGLOBIN GLYCOSYLATED A1C: CPT

## 2021-02-19 PROCEDURE — 80061 LIPID PANEL: CPT

## 2021-02-22 ENCOUNTER — TELEPHONE (OUTPATIENT)
Dept: INTERNAL MEDICINE CLINIC | Facility: CLINIC | Age: 36
End: 2021-02-22

## 2021-02-25 ENCOUNTER — TELEPHONE (OUTPATIENT)
Dept: INTERNAL MEDICINE CLINIC | Facility: CLINIC | Age: 36
End: 2021-02-25

## 2021-02-25 NOTE — TELEPHONE ENCOUNTER
Please call patient to schedule annual check-up. She already had labs done recently, so no need for additional labs.

## 2021-03-01 NOTE — TELEPHONE ENCOUNTER
We have been trying to contact the pt since 02/09/21 with no answer and no call back. Have sent letters via [employer] that have not been read. Please advise. Should we send certified letter?

## 2021-03-15 DIAGNOSIS — Z23 NEED FOR VACCINATION: ICD-10-CM

## 2021-03-22 ENCOUNTER — OFFICE VISIT (OUTPATIENT)
Dept: INTERNAL MEDICINE CLINIC | Facility: CLINIC | Age: 36
End: 2021-03-22
Payer: COMMERCIAL

## 2021-03-22 VITALS
BODY MASS INDEX: 40.22 KG/M2 | HEIGHT: 63 IN | DIASTOLIC BLOOD PRESSURE: 68 MMHG | HEART RATE: 90 BPM | RESPIRATION RATE: 16 BRPM | SYSTOLIC BLOOD PRESSURE: 110 MMHG | TEMPERATURE: 97 F | WEIGHT: 227 LBS

## 2021-03-22 DIAGNOSIS — F41.9 ANXIETY: ICD-10-CM

## 2021-03-22 DIAGNOSIS — E03.9 HYPOTHYROIDISM, UNSPECIFIED TYPE: ICD-10-CM

## 2021-03-22 DIAGNOSIS — G47.33 OSA (OBSTRUCTIVE SLEEP APNEA): ICD-10-CM

## 2021-03-22 DIAGNOSIS — K21.9 MILD ACID REFLUX: ICD-10-CM

## 2021-03-22 DIAGNOSIS — I10 ESSENTIAL HYPERTENSION: ICD-10-CM

## 2021-03-22 DIAGNOSIS — Z00.00 ANNUAL PHYSICAL EXAM: Primary | ICD-10-CM

## 2021-03-22 PROCEDURE — 3074F SYST BP LT 130 MM HG: CPT | Performed by: FAMILY MEDICINE

## 2021-03-22 PROCEDURE — 3008F BODY MASS INDEX DOCD: CPT | Performed by: FAMILY MEDICINE

## 2021-03-22 PROCEDURE — 99395 PREV VISIT EST AGE 18-39: CPT | Performed by: FAMILY MEDICINE

## 2021-03-22 PROCEDURE — 3078F DIAST BP <80 MM HG: CPT | Performed by: FAMILY MEDICINE

## 2021-03-22 RX ORDER — FAMOTIDINE 20 MG/1
20 TABLET ORAL AS NEEDED
COMMUNITY
End: 2021-07-23

## 2021-03-22 NOTE — PATIENT INSTRUCTIONS
Take Prilosec OTC for 2 weeks. Let me know if you still have symptoms after 2 weeks. Check your blood pressure for a few days on the Lisinopril to get a good idea of what your readings are on the medication.  Then stop the Lisinopril and continue checking women in this age group  At routine exams   Diabetes mellitus, type 2  Adults with no symptoms who are overweight or obese and have 1 or more other risk factors for diabetes  At least every 3 years.  Also, testing for diabetes during pregnancy after the 24t of these infections or vaccines  1 or 2 doses   Meningococcal Women at increased risk for infection should talk with their healthcare provider  1 or more doses   Pneumococcal conjugate vaccine (PCV13) and pneumococcal polysaccharide vaccine (PPSV23)  Women 15 to 65 years be screened for HIV and those younger or older people at increased risk. The CDC recommends that everyone between the ages of 15 and 59 get tested for HIV at least once as part of routine health care.    Audrey last reviewed this educationa

## 2021-03-23 NOTE — PROGRESS NOTES
HPI/Subjective:   Patient ID: Petty Light is a 38year old female. HPI Here for annual check-up. Patient is taking all meds as prescribed. Has Gyne and is up to date on pap.  Notes some feeling of lump in throat, acid reflux when she takes her F swelling. Gastrointestinal: Negative for abdominal pain and constipation. Endocrine: Negative for polydipsia, polyphagia and polyuria. Genitourinary: Negative for dysuria and frequency. Musculoskeletal: Negative for arthralgias and joint swelling. Skin is warm and dry. Neurological:      Mental Status: She is alert.    Psychiatric:         Behavior: Behavior normal.         Assessment & Plan:   Annual physical exam  (primary encounter diagnosis)  Hypothyroidism, unspecified type  Essential hyperten

## 2021-05-10 RX ORDER — FLUVOXAMINE MALEATE 100 MG
TABLET ORAL
Qty: 180 TABLET | Refills: 1 | Status: SHIPPED | OUTPATIENT
Start: 2021-05-10 | End: 2021-12-03

## 2021-05-10 RX ORDER — LISINOPRIL 5 MG/1
TABLET ORAL
Qty: 90 TABLET | Refills: 1 | Status: SHIPPED | OUTPATIENT
Start: 2021-05-10 | End: 2021-07-23

## 2021-05-10 RX ORDER — LEVOTHYROXINE SODIUM 0.05 MG/1
TABLET ORAL
Qty: 90 TABLET | Refills: 3 | Status: SHIPPED | OUTPATIENT
Start: 2021-05-10

## 2021-05-10 RX ORDER — ALPRAZOLAM 0.5 MG/1
TABLET ORAL
Qty: 20 TABLET | Refills: 0 | Status: SHIPPED | OUTPATIENT
Start: 2021-05-10

## 2021-05-10 NOTE — TELEPHONE ENCOUNTER
Last visit- 03/22/2021 cpe    Last refill-  05/15/2020 alprazolam 0.5mg QTY20 0R,  02/03/2021 lisinopril 5mg QTY90 0R,  02/03/2021 levothyroxine sodium 50mcg QTY90 0R    Last labs-  02/19/2021 hemoglobin a1c, tsh, cbc, lipid, cmp  Future Appointments   Manny

## 2021-05-10 NOTE — TELEPHONE ENCOUNTER
Last visit- 03/22/2021 cpe    Last refill-  02/03/2021 fluvoxamine maleate 100mg [id number] 0R    Last labs-  02/19/2021 hemoglobin a1c, tsh, cbc, lipid, cmp  Future Appointments   Date Time Provider Rosy Singletary   5/26/2021  2:10 PM Sonya Molina PA-C

## 2021-07-15 ENCOUNTER — HOSPITAL ENCOUNTER (EMERGENCY)
Facility: HOSPITAL | Age: 36
Discharge: HOME | End: 2021-07-16
Attending: EMERGENCY MEDICINE
Payer: COMMERCIAL

## 2021-07-15 DIAGNOSIS — S61.412A LACERATION OF LEFT HAND WITHOUT FOREIGN BODY, INITIAL ENCOUNTER: Primary | ICD-10-CM

## 2021-07-15 PROCEDURE — 12002 RPR S/N/AX/GEN/TRNK2.6-7.5CM: CPT

## 2021-07-15 PROCEDURE — 99283 EMERGENCY DEPT VISIT LOW MDM: CPT

## 2021-07-15 PROCEDURE — 99282 EMERGENCY DEPT VISIT SF MDM: CPT

## 2021-07-16 VITALS
BODY MASS INDEX: 40.75 KG/M2 | OXYGEN SATURATION: 95 % | WEIGHT: 230 LBS | HEART RATE: 89 BPM | RESPIRATION RATE: 19 BRPM | DIASTOLIC BLOOD PRESSURE: 89 MMHG | SYSTOLIC BLOOD PRESSURE: 141 MMHG | TEMPERATURE: 98 F | HEIGHT: 63 IN

## 2021-07-16 NOTE — ED PROVIDER NOTES
Patient Seen in: [facility] Emergency Department      History   Patient presents with:  Laceration/Abrasion    Stated Complaint: left hand lac    HPI/Subjective:   HPI    Jamilah Mane is a pleasant 14-year-old female presenting with hand laceration.   She Triage Vitals [07/15/21 4862]   BP (!) 131/94   Pulse 82   Resp 18   Temp    Temp src    SpO2 95 %   O2 Device None (Room air)       Current:BP (!) 131/94   Pulse 82   Resp 18   Ht 160 cm (5' 3\")   Wt 104.3 kg   LMP 07/10/2021   SpO2 95%   BMI 40.74 kg/m² 12:44 am    Follow-up:  Shari Miguel DO  [address], [address] [place] [address]  [phone]    In 1 week            Medications Prescribed:  Current Discharge Medication List

## 2021-07-16 NOTE — ED INITIAL ASSESSMENT (HPI)
Laceration to the left hand with a utility knife. Active bleeding, pressure dressing applied upon arrival to room. Unknown last tetanus shot. No numbness, tingling, pt able to move all fingers.

## 2021-07-19 ENCOUNTER — APPOINTMENT (OUTPATIENT)
Dept: OTHER | Facility: HOSPITAL | Age: 36
End: 2021-07-19
Attending: FAMILY MEDICINE

## 2021-07-21 ENCOUNTER — APPOINTMENT (OUTPATIENT)
Dept: OTHER | Facility: HOSPITAL | Age: 36
End: 2021-07-21
Attending: FAMILY MEDICINE

## 2021-07-23 ENCOUNTER — OFFICE VISIT (OUTPATIENT)
Dept: INTERNAL MEDICINE CLINIC | Facility: CLINIC | Age: 36
End: 2021-07-23
Payer: COMMERCIAL

## 2021-07-23 VITALS
DIASTOLIC BLOOD PRESSURE: 62 MMHG | OXYGEN SATURATION: 98 % | SYSTOLIC BLOOD PRESSURE: 126 MMHG | WEIGHT: 233.19 LBS | BODY MASS INDEX: 41.32 KG/M2 | HEIGHT: 63 IN | HEART RATE: 78 BPM | TEMPERATURE: 99 F

## 2021-07-23 DIAGNOSIS — S61.412D LACERATION OF LEFT HAND WITHOUT FOREIGN BODY, SUBSEQUENT ENCOUNTER: Primary | ICD-10-CM

## 2021-07-23 PROBLEM — I10 HYPERTENSION: Status: RESOLVED | Noted: 2019-11-23 | Resolved: 2021-07-23

## 2021-07-23 PROCEDURE — 99213 OFFICE O/P EST LOW 20 MIN: CPT | Performed by: FAMILY MEDICINE

## 2021-07-23 PROCEDURE — 3008F BODY MASS INDEX DOCD: CPT | Performed by: FAMILY MEDICINE

## 2021-07-23 PROCEDURE — 3074F SYST BP LT 130 MM HG: CPT | Performed by: FAMILY MEDICINE

## 2021-07-23 PROCEDURE — 3078F DIAST BP <80 MM HG: CPT | Performed by: FAMILY MEDICINE

## 2021-07-24 NOTE — PROGRESS NOTES
HPI/Subjective:   Patient ID: Shivani Krause is a 38year old female. HPI Here for suture removal. Left hand/thumb laceration on 7/15/21 with 5 sutures placed.  Cut herself accidentally when she was using a knife to cut off a child protection lock Meds This Visit:  Requested Prescriptions      No prescriptions requested or ordered in this encounter       Imaging & Referrals:  None

## 2021-09-07 ENCOUNTER — PATIENT MESSAGE (OUTPATIENT)
Dept: INTERNAL MEDICINE CLINIC | Facility: CLINIC | Age: 36
End: 2021-09-07

## 2021-09-07 NOTE — TELEPHONE ENCOUNTER
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  Please let me know if there's anything else I need to

## 2021-09-15 ENCOUNTER — TELEPHONE (OUTPATIENT)
Dept: INTERNAL MEDICINE CLINIC | Facility: CLINIC | Age: 36
End: 2021-09-15

## 2021-09-15 NOTE — TELEPHONE ENCOUNTER
Per pt, I faxed health exam form to Luis Bassett at fax # [phone]. Confirmation received. Called pt and left a voicemail informing her form was faxed an original form as left up in the  or her to .  Advised pt to return call if she had f

## 2021-10-06 ENCOUNTER — HOSPITAL ENCOUNTER (EMERGENCY)
Facility: HOSPITAL | Age: 36
Discharge: LEFT BEFORE DISCHARGE | End: 2021-10-06
Payer: COMMERCIAL

## 2021-10-06 VITALS
TEMPERATURE: 98 F | HEART RATE: 91 BPM | RESPIRATION RATE: 18 BRPM | SYSTOLIC BLOOD PRESSURE: 161 MMHG | BODY MASS INDEX: 41 KG/M2 | DIASTOLIC BLOOD PRESSURE: 96 MMHG | WEIGHT: 230 LBS | OXYGEN SATURATION: 98 %

## 2021-10-07 NOTE — ED INITIAL ASSESSMENT (HPI)
36YF c/c of sinusitis Pt state that she been having congestion and head pressure that started today Pt state that she had a cold over the weekend Pt state that she was at the urgent care earlier today

## 2021-12-03 RX ORDER — FLUVOXAMINE MALEATE 100 MG
TABLET ORAL
Qty: 180 TABLET | Refills: 1 | Status: SHIPPED | OUTPATIENT
Start: 2021-12-03 | End: 2022-01-17

## 2022-01-17 NOTE — PROGRESS NOTES
Subjective:   Patient ID: Kimi Michael is a 39year old female. Virtual Telephone Check-In    The patient verbally consents to a Virtual/Telephone Check-In visit on 1/17/22.     Patient understands and accepts financial responsibility for any deduc Hypothyroidism 2017   • Infertility, female 2017    IVF pregnancy   • Obesity    • Obstructive sleep apnea (adult) (pediatric) HST 10-12-16    AHI 39 SaO2 marino 50 %; uses CPAP   • Pregnancy-induced hypertension 2019    being monitored as of 11/1/19   • Se med increasing anxiety. Will re-visit med option once anxiety under better control. No orders of the defined types were placed in this encounter.       Meds This Visit:  Requested Prescriptions     Signed Prescriptions Disp Refills   • sertraline 50 MG

## 2022-02-18 NOTE — TELEPHONE ENCOUNTER
Last visit- 07/23/2021 laceration of left hand     Last refill- 01/17/2022 sertraline 50mg QTY60 0R    Last labs- 02/19/2021 hemoglobin a1c, tsh, cbc, lipid, cmp   No future appointments.

## 2022-03-07 NOTE — TELEPHONE ENCOUNTER
Last visit- 07/23/2021 laceration of left hand     Last refill- 02/18/2022 sertraline 50mg QTY60 0R    Last labs- 02.19/2021 hemoglobin a1c, tsh, cbc, lipid, cmp     No future appointments.

## 2022-04-04 RX ORDER — LEVOTHYROXINE SODIUM 0.05 MG/1
TABLET ORAL
Qty: 90 TABLET | Refills: 0 | Status: SHIPPED | OUTPATIENT
Start: 2022-04-04

## 2022-04-04 NOTE — TELEPHONE ENCOUNTER
Last OV 7.23.21 (suture removal)   Last PE 3.22.21-Due  Last REFILL 5.10.21 Levothyroxine 50mcg #90 3R  Last LABS No recent labs within last 12 months     No future appointments. Per PROTOCOL?  FAILED-no tsh is last 12 months, last tsh value     Please Advise

## 2022-04-04 NOTE — TELEPHONE ENCOUNTER
Please call patient to schedule annual check-up. Fasting lab orders are at [facility].

## 2022-05-11 ENCOUNTER — TELEPHONE (OUTPATIENT)
Dept: INTERNAL MEDICINE CLINIC | Facility: CLINIC | Age: 37
End: 2022-05-11

## 2022-05-11 NOTE — TELEPHONE ENCOUNTER
CPE   Future Appointments   Date Time Provider Rosy Singletary   6/9/2022  8:00 AM W[facility] LAB [facility]   6/9/2022  9:30 AM Carleen Ganser, Jackquline Romano, DO [facility]      Orders to Pepe Tammy   aware must fast no call back required

## 2022-06-09 ENCOUNTER — OFFICE VISIT (OUTPATIENT)
Dept: INTERNAL MEDICINE CLINIC | Facility: CLINIC | Age: 37
End: 2022-06-09
Payer: COMMERCIAL

## 2022-06-09 ENCOUNTER — LAB ENCOUNTER (OUTPATIENT)
Dept: LAB | Age: 37
End: 2022-06-09
Attending: FAMILY MEDICINE
Payer: COMMERCIAL

## 2022-06-09 VITALS
DIASTOLIC BLOOD PRESSURE: 62 MMHG | HEART RATE: 66 BPM | BODY MASS INDEX: 41.11 KG/M2 | WEIGHT: 232 LBS | SYSTOLIC BLOOD PRESSURE: 126 MMHG | OXYGEN SATURATION: 99 % | HEIGHT: 63 IN

## 2022-06-09 DIAGNOSIS — Z00.00 ANNUAL PHYSICAL EXAM: ICD-10-CM

## 2022-06-09 DIAGNOSIS — Z13.1 SCREENING FOR DIABETES MELLITUS: ICD-10-CM

## 2022-06-09 DIAGNOSIS — Z13.29 SCREENING FOR THYROID DISORDER: ICD-10-CM

## 2022-06-09 DIAGNOSIS — Z00.00 ANNUAL PHYSICAL EXAM: Primary | ICD-10-CM

## 2022-06-09 DIAGNOSIS — E03.9 HYPOTHYROIDISM, UNSPECIFIED TYPE: ICD-10-CM

## 2022-06-09 DIAGNOSIS — Z13.220 SCREENING, LIPID: ICD-10-CM

## 2022-06-09 DIAGNOSIS — Z13.0 SCREENING FOR DEFICIENCY ANEMIA: ICD-10-CM

## 2022-06-09 DIAGNOSIS — I10 ESSENTIAL HYPERTENSION: ICD-10-CM

## 2022-06-09 DIAGNOSIS — R73.03 PREDIABETES: ICD-10-CM

## 2022-06-09 DIAGNOSIS — G47.33 OSA (OBSTRUCTIVE SLEEP APNEA): ICD-10-CM

## 2022-06-09 LAB
ALBUMIN SERPL-MCNC: 3.5 G/DL (ref 3.4–5)
ALBUMIN/GLOB SERPL: 1.1 {RATIO} (ref 1–2)
ALP LIVER SERPL-CCNC: 62 U/L
ALT SERPL-CCNC: 26 U/L
ANION GAP SERPL CALC-SCNC: 5 MMOL/L (ref 0–18)
AST SERPL-CCNC: 18 U/L (ref 15–37)
BASOPHILS # BLD AUTO: 0.04 X10(3) UL (ref 0–0.2)
BASOPHILS NFR BLD AUTO: 0.5 %
BILIRUB SERPL-MCNC: 0.3 MG/DL (ref 0.1–2)
BUN BLD-MCNC: 9 MG/DL (ref 7–18)
CALCIUM BLD-MCNC: 8.5 MG/DL (ref 8.5–10.1)
CHLORIDE SERPL-SCNC: 108 MMOL/L (ref 98–112)
CHOLEST SERPL-MCNC: 134 MG/DL (ref ?–200)
CO2 SERPL-SCNC: 27 MMOL/L (ref 21–32)
CREAT BLD-MCNC: 0.77 MG/DL
CREAT UR-SCNC: 269 MG/DL
EOSINOPHIL # BLD AUTO: 0.14 X10(3) UL (ref 0–0.7)
EOSINOPHIL NFR BLD AUTO: 1.6 %
ERYTHROCYTE [DISTWIDTH] IN BLOOD BY AUTOMATED COUNT: 13 %
EST. AVERAGE GLUCOSE BLD GHB EST-MCNC: 128 MG/DL (ref 68–126)
FASTING PATIENT LIPID ANSWER: YES
FASTING STATUS PATIENT QL REPORTED: YES
GLOBULIN PLAS-MCNC: 3.1 G/DL (ref 2.8–4.4)
GLUCOSE BLD-MCNC: 129 MG/DL (ref 70–99)
HBA1C MFR BLD: 6.1 % (ref ?–5.7)
HCT VFR BLD AUTO: 40.6 %
HDLC SERPL-MCNC: 46 MG/DL (ref 40–59)
HGB BLD-MCNC: 13.2 G/DL
IMM GRANULOCYTES # BLD AUTO: 0.04 X10(3) UL (ref 0–1)
IMM GRANULOCYTES NFR BLD: 0.5 %
LDLC SERPL CALC-MCNC: 69 MG/DL (ref ?–100)
LYMPHOCYTES # BLD AUTO: 2.56 X10(3) UL (ref 1–4)
LYMPHOCYTES NFR BLD AUTO: 29.3 %
MCH RBC QN AUTO: 28.8 PG (ref 26–34)
MCHC RBC AUTO-ENTMCNC: 32.5 G/DL (ref 31–37)
MCV RBC AUTO: 88.6 FL
MICROALBUMIN UR-MCNC: 1.82 MG/DL
MICROALBUMIN/CREAT 24H UR-RTO: 6.8 UG/MG (ref ?–30)
MONOCYTES # BLD AUTO: 0.7 X10(3) UL (ref 0.1–1)
MONOCYTES NFR BLD AUTO: 8 %
NEUTROPHILS # BLD AUTO: 5.27 X10 (3) UL (ref 1.5–7.7)
NEUTROPHILS # BLD AUTO: 5.27 X10(3) UL (ref 1.5–7.7)
NEUTROPHILS NFR BLD AUTO: 60.1 %
NONHDLC SERPL-MCNC: 88 MG/DL (ref ?–130)
OSMOLALITY SERPL CALC.SUM OF ELEC: 290 MOSM/KG (ref 275–295)
PLATELET # BLD AUTO: 276 10(3)UL (ref 150–450)
POTASSIUM SERPL-SCNC: 4.5 MMOL/L (ref 3.5–5.1)
PROT SERPL-MCNC: 6.6 G/DL (ref 6.4–8.2)
RBC # BLD AUTO: 4.58 X10(6)UL
SODIUM SERPL-SCNC: 140 MMOL/L (ref 136–145)
TRIGL SERPL-MCNC: 100 MG/DL (ref 30–149)
TSI SER-ACNC: 1.19 MIU/ML (ref 0.36–3.74)
VLDLC SERPL CALC-MCNC: 15 MG/DL (ref 0–30)
WBC # BLD AUTO: 8.8 X10(3) UL (ref 4–11)

## 2022-06-09 PROCEDURE — 82043 UR ALBUMIN QUANTITATIVE: CPT

## 2022-06-09 PROCEDURE — 84443 ASSAY THYROID STIM HORMONE: CPT

## 2022-06-09 PROCEDURE — 85025 COMPLETE CBC W/AUTO DIFF WBC: CPT

## 2022-06-09 PROCEDURE — 3078F DIAST BP <80 MM HG: CPT | Performed by: FAMILY MEDICINE

## 2022-06-09 PROCEDURE — 83036 HEMOGLOBIN GLYCOSYLATED A1C: CPT

## 2022-06-09 PROCEDURE — 3074F SYST BP LT 130 MM HG: CPT | Performed by: FAMILY MEDICINE

## 2022-06-09 PROCEDURE — 3008F BODY MASS INDEX DOCD: CPT | Performed by: FAMILY MEDICINE

## 2022-06-09 PROCEDURE — 82570 ASSAY OF URINE CREATININE: CPT

## 2022-06-09 PROCEDURE — 80061 LIPID PANEL: CPT

## 2022-06-09 PROCEDURE — 80053 COMPREHEN METABOLIC PANEL: CPT

## 2022-06-09 PROCEDURE — 99395 PREV VISIT EST AGE 18-39: CPT | Performed by: FAMILY MEDICINE

## 2022-06-09 RX ORDER — SERTRALINE HYDROCHLORIDE 100 MG/1
100 TABLET, FILM COATED ORAL DAILY
Qty: 90 TABLET | Refills: 3 | COMMUNITY
Start: 2022-06-09

## 2022-07-05 RX ORDER — SERTRALINE HYDROCHLORIDE 100 MG/1
TABLET, FILM COATED ORAL
Qty: 135 TABLET | Refills: 2 | Status: SHIPPED | OUTPATIENT
Start: 2022-07-05

## 2022-07-22 NOTE — TELEPHONE ENCOUNTER
Last visit- 06/09/2022 cpe     Last refill- 04/04/2022 levothyroxine 50mcg QTY90 0R    Last labs- 06/09/2022 tsh     No future appointments.

## 2022-07-25 ENCOUNTER — TELEPHONE (OUTPATIENT)
Dept: INTERNAL MEDICINE CLINIC | Facility: CLINIC | Age: 37
End: 2022-07-25

## 2022-07-25 RX ORDER — LEVOTHYROXINE SODIUM 0.05 MG/1
50 TABLET ORAL
Qty: 90 TABLET | Refills: 2 | Status: SHIPPED | OUTPATIENT
Start: 2022-07-25

## 2022-07-25 NOTE — TELEPHONE ENCOUNTER
Printed employment forms from patient message. Placed in AMS bin to review and sign. Will call pt once ready for .

## 2023-05-12 ENCOUNTER — PATIENT MESSAGE (OUTPATIENT)
Dept: INTERNAL MEDICINE CLINIC | Facility: CLINIC | Age: 38
End: 2023-05-12

## 2023-05-17 NOTE — TELEPHONE ENCOUNTER
Call patient to make pre-op appt- needs to be within 30 days of surgery- surgery appears to be scheduled for 6/27/23. No need for labs/EKG prior. 15 mins.

## 2023-05-19 NOTE — TELEPHONE ENCOUNTER
Future Appointments   Date Time Provider Rosy Singletary   5/31/2023  3:00 PM José Miguel Polanco DO [facility]

## 2023-05-31 ENCOUNTER — PATIENT MESSAGE (OUTPATIENT)
Dept: INTERNAL MEDICINE CLINIC | Facility: CLINIC | Age: 38
End: 2023-05-31

## 2023-05-31 NOTE — TELEPHONE ENCOUNTER
From: Kaylynn Yoon  To: Mely Garcia DO  Sent: 5/31/2023 12:21 PM CDT  Subject: Mammogram    Hi,   My plastic surgeon states that I need a mammogram. Am I able to get an order for it before my appointment on June 9th? Thanks!   Yisel Butterfield.

## 2023-06-09 ENCOUNTER — TELEPHONE (OUTPATIENT)
Dept: INTERNAL MEDICINE CLINIC | Facility: CLINIC | Age: 38
End: 2023-06-09

## 2023-06-09 ENCOUNTER — OFFICE VISIT (OUTPATIENT)
Dept: INTERNAL MEDICINE CLINIC | Facility: CLINIC | Age: 38
End: 2023-06-09
Payer: COMMERCIAL

## 2023-06-09 VITALS
OXYGEN SATURATION: 99 % | TEMPERATURE: 98 F | SYSTOLIC BLOOD PRESSURE: 132 MMHG | BODY MASS INDEX: 43.05 KG/M2 | WEIGHT: 243 LBS | DIASTOLIC BLOOD PRESSURE: 82 MMHG | RESPIRATION RATE: 18 BRPM | HEART RATE: 88 BPM | HEIGHT: 63 IN

## 2023-06-09 DIAGNOSIS — Z01.818 PREOPERATIVE EXAMINATION: Primary | ICD-10-CM

## 2023-06-09 DIAGNOSIS — N62 MACROMASTIA: ICD-10-CM

## 2023-06-09 PROCEDURE — 3079F DIAST BP 80-89 MM HG: CPT | Performed by: FAMILY MEDICINE

## 2023-06-09 PROCEDURE — 99213 OFFICE O/P EST LOW 20 MIN: CPT | Performed by: FAMILY MEDICINE

## 2023-06-09 PROCEDURE — 3075F SYST BP GE 130 - 139MM HG: CPT | Performed by: FAMILY MEDICINE

## 2023-06-09 PROCEDURE — 3008F BODY MASS INDEX DOCD: CPT | Performed by: FAMILY MEDICINE

## 2023-06-16 RX ORDER — ACETAMINOPHEN 500 MG
500 TABLET ORAL EVERY 6 HOURS PRN
COMMUNITY
End: 2023-06-27

## 2023-06-26 ENCOUNTER — ANESTHESIA EVENT (OUTPATIENT)
Dept: SURGERY | Facility: HOSPITAL | Age: 38
End: 2023-06-26
Payer: COMMERCIAL

## 2023-06-26 ENCOUNTER — TELEPHONE (OUTPATIENT)
Dept: INTERNAL MEDICINE CLINIC | Facility: CLINIC | Age: 38
End: 2023-06-26

## 2023-06-27 ENCOUNTER — ANESTHESIA (OUTPATIENT)
Dept: SURGERY | Facility: HOSPITAL | Age: 38
End: 2023-06-27
Payer: COMMERCIAL

## 2023-06-27 ENCOUNTER — HOSPITAL ENCOUNTER (OUTPATIENT)
Facility: HOSPITAL | Age: 38
Setting detail: HOSPITAL OUTPATIENT SURGERY
Discharge: HOME | End: 2023-06-27
Attending: PLASTIC SURGERY | Admitting: PLASTIC SURGERY
Payer: COMMERCIAL

## 2023-06-27 VITALS
HEART RATE: 95 BPM | OXYGEN SATURATION: 99 % | HEIGHT: 63 IN | WEIGHT: 242.19 LBS | SYSTOLIC BLOOD PRESSURE: 131 MMHG | BODY MASS INDEX: 42.91 KG/M2 | DIASTOLIC BLOOD PRESSURE: 95 MMHG | RESPIRATION RATE: 18 BRPM | TEMPERATURE: 98 F

## 2023-06-27 DIAGNOSIS — N62 HYPERTROPHY OF BREAST: ICD-10-CM

## 2023-06-27 LAB — B-HCG UR QL: NEGATIVE

## 2023-06-27 PROCEDURE — 0HBV0ZZ EXCISION OF BILATERAL BREAST, OPEN APPROACH: ICD-10-PCS | Performed by: PLASTIC SURGERY

## 2023-06-27 PROCEDURE — 81025 URINE PREGNANCY TEST: CPT

## 2023-06-27 PROCEDURE — 88305 TISSUE EXAM BY PATHOLOGIST: CPT | Performed by: PLASTIC SURGERY

## 2023-06-27 RX ORDER — ONDANSETRON 2 MG/ML
INJECTION INTRAMUSCULAR; INTRAVENOUS AS NEEDED
Status: DISCONTINUED | OUTPATIENT
Start: 2023-06-27 | End: 2023-06-27 | Stop reason: SURG

## 2023-06-27 RX ORDER — ROCURONIUM BROMIDE 10 MG/ML
INJECTION, SOLUTION INTRAVENOUS AS NEEDED
Status: DISCONTINUED | OUTPATIENT
Start: 2023-06-27 | End: 2023-06-27 | Stop reason: SURG

## 2023-06-27 RX ORDER — NICOTINE POLACRILEX 4 MG
15 LOZENGE BUCCAL
Status: DISCONTINUED | OUTPATIENT
Start: 2023-06-27 | End: 2023-06-27

## 2023-06-27 RX ORDER — SODIUM CHLORIDE, SODIUM LACTATE, POTASSIUM CHLORIDE, CALCIUM CHLORIDE 600; 310; 30; 20 MG/100ML; MG/100ML; MG/100ML; MG/100ML
INJECTION, SOLUTION INTRAVENOUS CONTINUOUS
Status: DISCONTINUED | OUTPATIENT
Start: 2023-06-27 | End: 2023-06-27

## 2023-06-27 RX ORDER — HYDROCODONE BITARTRATE AND ACETAMINOPHEN 5; 325 MG/1; MG/1
2 TABLET ORAL ONCE AS NEEDED
Status: COMPLETED | OUTPATIENT
Start: 2023-06-27 | End: 2023-06-27

## 2023-06-27 RX ORDER — HEPARIN SODIUM 5000 [USP'U]/ML
5000 INJECTION, SOLUTION INTRAVENOUS; SUBCUTANEOUS ONCE
Status: COMPLETED | OUTPATIENT
Start: 2023-06-27 | End: 2023-06-27

## 2023-06-27 RX ORDER — LIDOCAINE HYDROCHLORIDE AND EPINEPHRINE 10; 10 MG/ML; UG/ML
INJECTION, SOLUTION INFILTRATION; PERINEURAL AS NEEDED
Status: DISCONTINUED | OUTPATIENT
Start: 2023-06-27 | End: 2023-06-27 | Stop reason: HOSPADM

## 2023-06-27 RX ORDER — CEFAZOLIN SODIUM/WATER 2 G/20 ML
2 SYRINGE (ML) INTRAVENOUS ONCE
Status: COMPLETED | OUTPATIENT
Start: 2023-06-27 | End: 2023-06-27

## 2023-06-27 RX ORDER — HYDROCODONE BITARTRATE AND ACETAMINOPHEN 5; 325 MG/1; MG/1
1 TABLET ORAL ONCE AS NEEDED
Status: COMPLETED | OUTPATIENT
Start: 2023-06-27 | End: 2023-06-27

## 2023-06-27 RX ORDER — GLYCOPYRROLATE 0.2 MG/ML
INJECTION, SOLUTION INTRAMUSCULAR; INTRAVENOUS AS NEEDED
Status: DISCONTINUED | OUTPATIENT
Start: 2023-06-27 | End: 2023-06-27 | Stop reason: SURG

## 2023-06-27 RX ORDER — DEXAMETHASONE SODIUM PHOSPHATE 4 MG/ML
VIAL (ML) INJECTION AS NEEDED
Status: DISCONTINUED | OUTPATIENT
Start: 2023-06-27 | End: 2023-06-27 | Stop reason: SURG

## 2023-06-27 RX ORDER — INDOCYANINE GREEN AND WATER 25 MG
KIT INJECTION AS NEEDED
Status: DISCONTINUED | OUTPATIENT
Start: 2023-06-27 | End: 2023-06-27 | Stop reason: SURG

## 2023-06-27 RX ORDER — ACETAMINOPHEN 500 MG
1000 TABLET ORAL ONCE
Status: DISCONTINUED | OUTPATIENT
Start: 2023-06-27 | End: 2023-06-27 | Stop reason: HOSPADM

## 2023-06-27 RX ORDER — HYDROCODONE BITARTRATE AND ACETAMINOPHEN 5; 325 MG/1; MG/1
TABLET ORAL
COMMUNITY
Start: 2023-06-25

## 2023-06-27 RX ORDER — SCOLOPAMINE TRANSDERMAL SYSTEM 1 MG/1
1 PATCH, EXTENDED RELEASE TRANSDERMAL ONCE
Status: DISCONTINUED | OUTPATIENT
Start: 2023-06-27 | End: 2023-06-27 | Stop reason: HOSPADM

## 2023-06-27 RX ORDER — ESMOLOL HYDROCHLORIDE 10 MG/ML
INJECTION INTRAVENOUS AS NEEDED
Status: DISCONTINUED | OUTPATIENT
Start: 2023-06-27 | End: 2023-06-27 | Stop reason: SURG

## 2023-06-27 RX ORDER — ACETAMINOPHEN 500 MG
1000 TABLET ORAL ONCE AS NEEDED
Status: COMPLETED | OUTPATIENT
Start: 2023-06-27 | End: 2023-06-27

## 2023-06-27 RX ORDER — NICOTINE POLACRILEX 4 MG
30 LOZENGE BUCCAL
Status: DISCONTINUED | OUTPATIENT
Start: 2023-06-27 | End: 2023-06-27

## 2023-06-27 RX ORDER — BUPIVACAINE HYDROCHLORIDE 5 MG/ML
INJECTION, SOLUTION EPIDURAL; INTRACAUDAL AS NEEDED
Status: DISCONTINUED | OUTPATIENT
Start: 2023-06-27 | End: 2023-06-27 | Stop reason: HOSPADM

## 2023-06-27 RX ORDER — CEFADROXIL 500 MG/1
CAPSULE ORAL
COMMUNITY
Start: 2023-06-25

## 2023-06-27 RX ORDER — NEOSTIGMINE METHYLSULFATE 1 MG/ML
INJECTION, SOLUTION INTRAVENOUS AS NEEDED
Status: DISCONTINUED | OUTPATIENT
Start: 2023-06-27 | End: 2023-06-27 | Stop reason: SURG

## 2023-06-27 RX ORDER — DEXTROSE MONOHYDRATE 25 G/50ML
50 INJECTION, SOLUTION INTRAVENOUS
Status: DISCONTINUED | OUTPATIENT
Start: 2023-06-27 | End: 2023-06-27

## 2023-06-27 RX ORDER — NALOXONE HYDROCHLORIDE 0.4 MG/ML
80 INJECTION, SOLUTION INTRAMUSCULAR; INTRAVENOUS; SUBCUTANEOUS AS NEEDED
Status: DISCONTINUED | OUTPATIENT
Start: 2023-06-27 | End: 2023-06-27

## 2023-06-27 RX ORDER — PHENYLEPHRINE HCL 10 MG/ML
VIAL (ML) INJECTION AS NEEDED
Status: DISCONTINUED | OUTPATIENT
Start: 2023-06-27 | End: 2023-06-27 | Stop reason: SURG

## 2023-06-27 RX ORDER — ONDANSETRON 2 MG/ML
4 INJECTION INTRAMUSCULAR; INTRAVENOUS EVERY 6 HOURS PRN
Status: DISCONTINUED | OUTPATIENT
Start: 2023-06-27 | End: 2023-06-27

## 2023-06-27 RX ADMIN — ESMOLOL HYDROCHLORIDE 10 MG: 10 INJECTION INTRAVENOUS at 11:35:00

## 2023-06-27 RX ADMIN — PHENYLEPHRINE HCL 100 MCG: 10 MG/ML VIAL (ML) INJECTION at 11:27:00

## 2023-06-27 RX ADMIN — ESMOLOL HYDROCHLORIDE 10 MG: 10 INJECTION INTRAVENOUS at 08:06:00

## 2023-06-27 RX ADMIN — PHENYLEPHRINE HCL 100 MCG: 10 MG/ML VIAL (ML) INJECTION at 09:29:00

## 2023-06-27 RX ADMIN — PHENYLEPHRINE HCL 50 MCG: 10 MG/ML VIAL (ML) INJECTION at 08:25:00

## 2023-06-27 RX ADMIN — ONDANSETRON 4 MG: 2 INJECTION INTRAMUSCULAR; INTRAVENOUS at 07:48:00

## 2023-06-27 RX ADMIN — ESMOLOL HYDROCHLORIDE 10 MG: 10 INJECTION INTRAVENOUS at 08:39:00

## 2023-06-27 RX ADMIN — ROCURONIUM BROMIDE 10 MG: 10 INJECTION, SOLUTION INTRAVENOUS at 10:37:00

## 2023-06-27 RX ADMIN — ROCURONIUM BROMIDE 20 MG: 10 INJECTION, SOLUTION INTRAVENOUS at 09:12:00

## 2023-06-27 RX ADMIN — ROCURONIUM BROMIDE 5 MG: 10 INJECTION, SOLUTION INTRAVENOUS at 10:57:00

## 2023-06-27 RX ADMIN — GLYCOPYRROLATE 0.8 MG: 0.2 INJECTION, SOLUTION INTRAMUSCULAR; INTRAVENOUS at 11:18:00

## 2023-06-27 RX ADMIN — ROCURONIUM BROMIDE 15 MG: 10 INJECTION, SOLUTION INTRAVENOUS at 10:09:00

## 2023-06-27 RX ADMIN — DEXAMETHASONE SODIUM PHOSPHATE 8 MG: 4 MG/ML VIAL (ML) INJECTION at 07:47:00

## 2023-06-27 RX ADMIN — ROCURONIUM BROMIDE 50 MG: 10 INJECTION, SOLUTION INTRAVENOUS at 07:48:00

## 2023-06-27 RX ADMIN — PHENYLEPHRINE HCL 50 MCG: 10 MG/ML VIAL (ML) INJECTION at 09:22:00

## 2023-06-27 RX ADMIN — CEFAZOLIN SODIUM/WATER 2 G: 2 G/20 ML SYRINGE (ML) INTRAVENOUS at 11:29:00

## 2023-06-27 RX ADMIN — CEFAZOLIN SODIUM/WATER 2 G: 2 G/20 ML SYRINGE (ML) INTRAVENOUS at 07:40:00

## 2023-06-27 RX ADMIN — INDOCYANINE GREEN AND WATER 7.5 MG: 25 MG KIT INJECTION at 09:55:00

## 2023-06-27 RX ADMIN — PHENYLEPHRINE HCL 50 MCG: 10 MG/ML VIAL (ML) INJECTION at 08:16:00

## 2023-06-27 RX ADMIN — NEOSTIGMINE METHYLSULFATE 4 MG: 1 INJECTION, SOLUTION INTRAVENOUS at 11:18:00

## 2023-06-27 RX ADMIN — ROCURONIUM BROMIDE 20 MG: 10 INJECTION, SOLUTION INTRAVENOUS at 08:30:00

## 2023-06-27 RX ADMIN — PHENYLEPHRINE HCL 50 MCG: 10 MG/ML VIAL (ML) INJECTION at 08:53:00

## 2023-06-27 NOTE — DISCHARGE INSTRUCTIONS
Home Care Instructions Following Your Breast Reduction     Haily Blancoer-  We hope you were pleased with your care at [facility].  We wish you the best outcome and overall experience with your operation. These instructions will help to minimize pain, optimize healing, and improve the likelihood of a successful result. What To Expect  There will be some spotting of the incision lines for the next 1-2 weeks. Your breasts will be swollen and might feel congested (similar to breast feeding) for the next 1-2 weeks  Temporary areas of numbness are typical in the early weeks following a breast reduction. Normalization of sensation can typically take up to several months following the operation. Bandages (Dressing)  Keep dressings clean and dry  Do not remove your bra  Reinforce the dressing with insertion of additional pads (pantiliners, incontinence pads) as needed  Do not shower until after your first appointment with Dr. Gilmar Krause  Empty your JENNIFER drains at 8 am and 8 pm each day  Record each drain separately and use the drain sheet given to you by Dr. Chelsea Shelton to record the drainage. Follow the instructions on the drain sheet. Wash your hands before and after emptying your drains  Bring drain sheet with you to your first office visit    Bathing/Showers  You can resume showers after your drains are removed in the office  No baths, swimming, or hot tubs until you receive medical permission    Pain Medication: Norco  Take one or two tablets every four hours as needed for pain. Do not exceed 8 (eight) tablets each day  Do not take narcotics, if you do not have pain. Antibiotics:  Antibiotics will help minimize the risk of a wound infection  Fill the prescription as directed by Dr. Chelsea Shelton  Follow the instructions as written on the bottle's label  Call Dr. Chelsea Shelton, if you experience nausea, rash, or other symptoms which might be a possible side effect.     Over-The-Counter Medication  Non-prescription anti-inflammatory medications can also help to ease the pain. You can take Aleve or ibuprofen   Take as directed on the bottle  Drink a full glass of water with the medication    Home Medication  Resume your home medications as instructed  Do not resume herbal medications for two weeks    Diet  Resume your normal diet    Activity  No strenuous activity or heavy lifting  You can go up and down the stairs as tolerated. Use common sense  You cannot return to work, if your work requires strenuous activity. You cannot return to physical exercise, sports, or gym workouts until you are allowed to participate in strenuous activity. Driving  Do not drive, if you are taking pain medication. Return to Work or Ta[place]e can return to work when you are not taking pain medication, if your work does not involve strenuous activity. Contact Dr. Elliott Mason office, if you need a medical note. You can return to school in 4-5 days but not sports or gym class for  6 weeks. Follow-up Appointment with Dr. Jose Luis Wilkinson scheduled your first postoperative visit at the time of your preoperative office visit. Call Dr. Elliott Mason office today for an appointment in two days, if you cannot remember the appointment details. The number is . Verify your appointment date, day, time, and location. At your 1st postoperative office visit:  Your drains will be removed, wounds will be evaluated, healing assessed, and any additional concerns and instructions will be discussed. Questions or Concerns  Call Dr. Elliott Mason cell,  if you experience severe pain not controlled by pain medication, swelling, numbness, tingling, bleeding, fever, or other concerns. The number is: . Mary Murry  Thank you for coming to [facility] for your operation. The nurses and the anesthesiologist try very hard to make sure you receive the best care possible. Your trust in them is greatly appreciated.     Thanks so much,   Stephon Lawton

## 2023-06-27 NOTE — BRIEF OP NOTE
Pre-Operative Diagnosis: Hypertrophy of breast [N62]     Post-Operative Diagnosis: Hypertrophy of breast [N62]      Procedure Performed:   BREAST REDUCTION - BILATERAL    Surgeon(s) and Role:     * Evelyn Michelle MD - Primary    Assistant(s):  Surgical Assistant.: Johnnie Lerma     Surgical Findings: Normal breast tissue. Spy Elite: RIght medial flap at Inverted T with poor perfusion was excised.      Specimen: RIght and left breast tissue     Estimated Blood Loss: Blood Output: 100 mL (6/27/2023 11:11 AM)      Dictation Number:  ?    Gigi Shields MD  6/27/2023  11:47 AM

## 2023-07-03 NOTE — OPERATIVE REPORT
OPERATIVE NOTE: Bilateral Breast Reduction     NAME:     Moira Brandon [facility]  DATE:     June 27, 2023   LOCATION:    [facility]  CSN:     [id number]  MRN:     [id number]  PREOPERATIVE DIAGNOSIS: Bilateral Macromastia  POSTOPERATIVE DIAGNOSIS: Same  SURGEON:    Dr. Bree Muller     ASSISTANT:    Verner Honer, CSA  SURGICAL FINDINGS:  Normal Breast Tissue  SPECIMEN:    Right and Left Breast Tissue                                                                            INDICATION FOR TREATMENT:             Breast reduction is indicated to resolve Aby's symptoms associated with her macromastia. She presents with bilateral macromastia and is a candidate for a bilateral breast reduction. The procedure, its goals, alternative treatments, limitations, risks, and postoperative course were discussed with her. We agreed on the operative sites while I was marking the breasts. Her additional questions have been discussed answered. She understands the procedure and gives her consent. 15-20 minutes. PROCEDURE:  She was preoperatively marked in the privacy of her SDS room. Her midline anterior chest, clavicular margins, inframammary folds, vertical breast meridians, and planned lines for a vertical mastopexy were inscribed while she was in a sitting position. She was brought to the operating room and placed on the OR table in a supine position. She was positioned after administration of general anesthesia. Her breasts were prepped and draped in the usual fashion. A timeout was rendered by the circulating nurse and verified by Dr. Clayton Servin and the anesthesiologist.  An incision was made along the previously inscribed marks within the right areolus and the lines of the vertical mammoplasty inscription. The skin inferior to the right areolus within the boundaries of the outer incision was de-epithelialized.   A right breast flap was then elevated deep to Justus's fascia extending medial to the sternal border, lateral to the anterior axillary line, inferior to the inframammary fold, and superiorly to the planned level of the nipple-areolar complex. The vertical meridian to the inferior pedicle was marked with methylene blue. The base width of the inferior pedicle was 9 cm, and the borders of the inferior pedicle was marked. The breast tissue medial to the inferior pedicle, lateral to the sternal border, superior to the inframammary fold, deep to the medial flap, and superficial to the muscle was grossly removed. The breast tissue lateral to the inferior pedicle, medial to the anterior axillary line, superior to the inframammary fold, deep to the lateral flap, superficial to the muscle was grossly removed. The superficial breast tissue was removed within the keyhole. The NAC was transposed into its recipient bed and loosely stapled. The vertical limbs of the medial and lateral flap were loosely stapled. Patient was then placed in a sitting position. Horizontal limbs to the medial and lateral flap were drawn with methylene blue. The size and shape of the right breast was also assessed. She was then placed into a supine position, and the skin inferior to the horizontal limbs of the medial and lateral flap was excised. Refinements were made to the breast tissue. The right breast was loosely stapled closed, and the breast was again assessed in the sitting position. This process was repeated until the surgeon was satisfied with the shape and size of the right breast.  The same steps were performed on the left breast.  Patient was placed in the sitting position to assess symmetry of breast shape, size, and position. Skin tailoring and breast tissue refinements were made with the patient in the supine position. This process was again repeated until the surgeon was satisfied. The Lindaen 207 was used to determine vascular viability of the skin flaps.   Findings: Right medial flap at the inverted T demonstrated poor perfusion. This tissue was demarcated with methylene blue and excised. The right breast tissue was inspected for hemostasis, irrigated with copious amounts of warm saline. A 10 mm Marques-Mccall drain was placed deep to the flap closure and secured to the lateral inframammary fold with a 4-0 nylon. The horizontal limbs to the medial and lateral flap were secured to the inframammary fold with a deep dermal running 4-0 Monocryl and a 4-0 Monocryl subcuticular suture. The vertical limbs to the medial and lateral flap were closed with a 4-0 Monocryl subcuticular suture. The areolus closed with a  4-0 vicryl subcuticular suture. The same steps of obtaining hemostasis, irrigating, and closing were performed on the left breast.  The wounds were then steri-stripped, dressed with two Kerlix rolls, ABD pads, and a surgical bra. Needle and sponge counts were correct. There was no active bleeding. All tissue visualized appeared to be grossly normal.  The flaps were viable in their entirety. The patient was extubated in the operating room. Patient was taken to the PACU in stable condition. Patient was given her prescriptions for pain medication and antibiotics, and her postoperative appointment was arranged prior to the operation. She was given written instructions for home care. She was discharged from the hospital to home in satisfactory condition. The surgical assistant (ALIA) was present to help set up the operating room, position the patient, and was scrubbed through the entirety of the procedure. She helped to apply surgical dressings, transfer the patient to the [facility], and accompanied the transport of the patient to the PACU. The surgical assistant participated in all aspects of the procedure by critical retraction and exposure of the soft tissue for flap elevation.  Her assistance was essential for the protection of vital anatomical structures and at all times medically necessary for the surgeon to successfully complete the operation. A credentialed and well-trained surgical assistant's ability exceeds the ability of a surgical technician (CST). Plastic surgical residents are not available at this institution, and the surgical assistant was required for the safety of patient care. Right breast tissue removed: 1361 gms. Left breast tissue removed: 1295 gms.

## 2023-07-14 ENCOUNTER — TELEPHONE (OUTPATIENT)
Dept: INTERNAL MEDICINE CLINIC | Facility: CLINIC | Age: 38
End: 2023-07-14

## 2023-07-14 RX ORDER — CODEINE PHOSPHATE AND GUAIFENESIN 10; 100 MG/5ML; MG/5ML
SOLUTION ORAL
Qty: 118 ML | Refills: 0 | Status: SHIPPED | OUTPATIENT
Start: 2023-07-14

## 2023-07-14 RX ORDER — BENZONATATE 100 MG/1
CAPSULE ORAL
Qty: 30 CAPSULE | Refills: 0 | Status: SHIPPED | OUTPATIENT
Start: 2023-07-14

## 2023-07-14 NOTE — TELEPHONE ENCOUNTER
Patient states only using tylenol PRN but has not needed anything. Denies opioid use. Confirmed pharmacy is updated on file. Patient states son is ill as well, cold symptoms runny nose, drainage and cough. Denies sob, cp, leg swelling, vision changes. States every time she coughs she feels discomfort at sutures. Discussed medication can cause drowsiness. Will send to AMS for medication to be send as previous instructed.

## 2023-07-14 NOTE — TELEPHONE ENCOUNTER
Just verify current meds (is she on a opioid so then I wouldn't want her to take Cheratussin, for example)  and if no warning signs, I'll send Rx.

## 2023-07-14 NOTE — TELEPHONE ENCOUNTER
Recent breast reduction 6/27/2023; ok for virtual r/t cold and cough symptoms? Or would you like in office?

## 2023-07-14 NOTE — TELEPHONE ENCOUNTER
Patient calling has cold and cough since yesterday and recently had surgery. Patient is asking if AMS can give her a cough medication to help. She is having pain in her stiches when she coughs. No fever, just pain when she coughs.     [facility] [address] [place] [address] 5, [phone], [phone]

## 2023-12-12 ENCOUNTER — TELEPHONE (OUTPATIENT)
Dept: INTERNAL MEDICINE CLINIC | Facility: CLINIC | Age: 38
End: 2023-12-12

## 2023-12-12 DIAGNOSIS — Z00.00 ROUTINE GENERAL MEDICAL EXAMINATION AT A HEALTH CARE FACILITY: Primary | ICD-10-CM

## 2023-12-12 NOTE — TELEPHONE ENCOUNTER
Future Appointments   Date Time Provider Rosy Gemini   1/26/2024  1:30 PM Lisandro Nunez, DO [facility]     Orders to edward- Pt informed that labs need to be completed no sooner than 2 weeks prior to the appt.  Pt aware to fast-no call back required

## 2024-02-01 NOTE — TELEPHONE ENCOUNTER
Continue with metformin 1,000 mg daily  Continue with Actos 15 mg daily  Continue with Ozempic 2 mg weekly  Decrease Toujeo 60 units daily   LMTCB to schedule

## (undated) DIAGNOSIS — Z13.1 SCREENING FOR DIABETES MELLITUS: ICD-10-CM

## (undated) DIAGNOSIS — E03.9 HYPOTHYROIDISM, UNSPECIFIED TYPE: ICD-10-CM

## (undated) DIAGNOSIS — Z13.0 SCREENING FOR DEFICIENCY ANEMIA: ICD-10-CM

## (undated) DIAGNOSIS — R73.03 PREDIABETES: ICD-10-CM

## (undated) DIAGNOSIS — Z13.220 SCREENING, LIPID: ICD-10-CM

## (undated) DIAGNOSIS — Z13.29 SCREENING FOR THYROID DISORDER: ICD-10-CM

## (undated) DIAGNOSIS — I10 ESSENTIAL HYPERTENSION: ICD-10-CM

## (undated) DIAGNOSIS — Z00.00 ANNUAL PHYSICAL EXAM: Primary | ICD-10-CM

## (undated) DEVICE — ADHESIVE MASTISOL 2/3ML

## (undated) DEVICE — SKIN MARKER DUAL TIP WITH RULER CAP AND LABELS: Brand: DEVON

## (undated) DEVICE — SUT ETHILON 4-0 PS-2 1667H

## (undated) DEVICE — BASIC DOUBLE BASIN 1-LF: Brand: MEDLINE INDUSTRIES, INC.

## (undated) DEVICE — BREAST-HERNIA-PORT CDS-LF: Brand: MEDLINE INDUSTRIES, INC.

## (undated) DEVICE — STERILE POLYISOPRENE POWDER-FREE SURGICAL GLOVES: Brand: PROTEXIS

## (undated) DEVICE — 3M™ DURAPORE™ SURGICAL TAPE 2 INCHES X 10YARDS (5.0CM X 9.1M) 6ROLLS/CARTON 10CARTONS/CASE 1538-2: Brand: 3M™ DURAPORE™

## (undated) DEVICE — SOL NACL IRRIG 0.9% 1000ML BTL

## (undated) DEVICE — DRAIN SILICONE FLAT 10X20

## (undated) DEVICE — SUT MONOCRYL 4-0 PS-2 Y426H

## (undated) DEVICE — 3M™ IOBAN™ 2 ANTIMICROBIAL INCISE DRAPE 6650EZ: Brand: IOBAN™ 2

## (undated) DEVICE — 3M™ STERI-STRIP™ REINFORCED ADHESIVE SKIN CLOSURES, R1547, 1/2 IN X 4 IN (12 MM X 100 MM), 6 STRIPS/ENVELOPE: Brand: 3M™ STERI-STRIP™

## (undated) DEVICE — VIAL LABORATORY SPY

## (undated) DEVICE — LAPAROTOMY SPONGE - RF AND X-RAY DETECTABLE PRE-WASHED: Brand: SITUATE

## (undated) DEVICE — 3M™ STERI-STRIP™ REINFORCED ADHESIVE SKIN CLOSURES, R1548, 1 IN X 5 IN (25 MM X 125 MM), 4 STRIPS/ENVELOPE: Brand: 3M™ STERI-STRIP™

## (undated) DEVICE — DRAPE PACK CHEST & U BAR

## (undated) DEVICE — Device

## (undated) DEVICE — TRAXI PANNICULUS RETRACTOR WITH RETENTUS TECHNOLOGY (BMI 30-50): Brand: TRAXI® PANNICULUS RETRACTOR

## (undated) DEVICE — #15 STERILE STAINLESS BLADE: Brand: STERILE STAINLESS BLADES

## (undated) DEVICE — PAD,ABDOMINAL,8"X7.5",ST,LF,20/BX: Brand: MEDLINE INDUSTRIES, INC.

## (undated) DEVICE — COTTON-TIPPED APPLICATOR 6IN S

## (undated) DEVICE — PROXIMATE SKIN STAPLERS (35 WIDE) CONTAINS 35 STAINLESS STEEL STAPLES (FIXED HEAD): Brand: PROXIMATE

## (undated) DEVICE — APPLICATOR FBRTP 6IN STRL LF

## (undated) DEVICE — SKN PREP SPNG STKS PVP PNT STR: Brand: MEDLINE INDUSTRIES, INC.

## (undated) DEVICE — BANDAGE,GAUZE,BULKEE II,4.5"X4.1YD,STRL: Brand: MEDLINE

## (undated) DEVICE — FOAM ARMBOARD POSITION 3X5X24

## (undated) DEVICE — EVACUATOR RELIAVAC 100CC

## (undated) DEVICE — GOWN,SIRUS,FABRIC-REINFORCED,LARGE: Brand: MEDLINE

## (undated) DEVICE — 40580 - THE PINK PAD - ADVANCED TRENDELENBURG POSITIONING KIT: Brand: 40580 - THE PINK PAD - ADVANCED TRENDELENBURG POSITIONING KIT

## (undated) NOTE — MR AVS SNAPSHOT
[facility] [address] [address] [phone]               Thank you for choosing us for your health care visit with Favio Bush DO.   We are glad to serve you and happy to provide you with this summa these, for women ages 25 to 44. Talk with your healthcare provider to make sure you’re up-to-date on what you need.   Screening Who needs it How often   Alcohol misuse All women in this age group At routine exams   Blood pressure All women in this age group Hepatitis A Women at increased risk for infection – talk with your healthcare provider 2 doses given at least 6 months apart   Hepatitis B Women at increased risk for infection – talk with your healthcare provider 3 doses over 6 months; second dose should then at routine exams   Domestic violence Women at the age in which they are able to have children At routine exams   Sexually transmitted infection prevention Women who are sexually active At routine exams   Skin cancer Prevention of skin cancer in fair-s Cervical cancer Women ages 24 and older Women between ages 24 and 34 should have a Pap test every 3 years; women between ages 27 and 72 are advised to have a Pap test plus an HPV test every 5 years   Chlamydia Sexually active women ages 25 and younger, and doses; the second dose should be given 1 to 2 months after the first dose and the third dose given 6 months after the first dose   Influenza (flu) All women in this age group Once a year   Measles, mumps, rubella (MMR) All women in this age group who have the  USPSTF does not recommend CBE. 2Those who are 25years of age, who are not up-to-date on their childhood immunizations, should receive all appropriate catch-up vaccines recommended by the CDC.   Date Last Reviewed: 8/27/2015  © 2015-7533 [employer] Hepatitis C Anyone at increased risk At routine exams   HIV All women At routine exams   Obesity All women in this age group At routine exams   Syphilis Women at increased risk for infection – talk with your healthcare provider At routine exams   Tuberculo PPSV23: 1 to 2 doses through age 59, or 1 dose at 72 or older (protects against 23 types of pneumococcal bacteria)      Tetanus/diphtheria/pertussis (Td/Tdap) booster All women in this age group Td every 10 years, or a one-time dose of Tdap instead of a Td the  USPSTF does not recommend CBE. 2Those who are 25years of age, who are not up-to-date on their childhood immunizations, should receive all appropriate catch-up vaccines recommended by the CDC.   Date Last Reviewed: 8/27/2015  © 0433-1571 [employer] Current Medications          This list is accurate as of: 5/20/17 10:57 AM.  Always use your most recent med list.                ALPRAZolam 0.5 MG Tabs   Take 0.5 mg by mouth nightly as needed for Sleep.    Commonly known as:  Gertrudis Cleaves

## (undated) NOTE — LETTER
11/13/20        Remedios Cyr  [address] [facility] [address]      Dear Jorge Coates records indicate that you have outstanding lab work and or testing that was ordered for you and has not yet been completed:  Orders Placed This Encou

## (undated) NOTE — LETTER
May 24, 2018    Roosevelt Aleman  [address] [address]      Dear Janki Beckford: The following are the results of your recent tests. TB test 5/24/18 negative.     Results for orders placed or performed in visit on 05/22/18  -TB [facility]

## (undated) NOTE — LETTER
[facility]  Zachary Galan 60 [id number] [facility], [address] [place] [address]    Consent for Operation    Date: __________________    Time: _______________    1.  I authorize the performance upon Michael Carrera the following operation:                              Prim videotape. [facility] will not be responsible for storage or maintenance of this tape. 6. For the purpose of advancing medical education, I consent to the admittance of observers to the Operating Room.     7. I authorize the use of any specimen, organs Signature of Patient:   ___________________________    When the patient is a minor or mentally incompetent to give consent:  Signature of person authorized to consent for patient: ___________________________   Relationship to patient: _____________________ 3. I understand how the anesthesia medicine will help me (benefits). 4. I understand that with any type of anesthesia medicine there are risks:  a.  The most common risks are: nausea, vomiting, sore throat, muscle soreness, damage to my eyes, mouth, or t _____________________________________________________________________________  Witness        Date   Time  I have verified that the signature is that of the patient or patient’s representative, and that it was signed before the procedure    Page 2 of 2

## (undated) NOTE — LETTER
[facility] [address], [address]    Consent for Operation    Date: ______11/19/19____________    Time: _____0040__________    1.  I authorize the performance upon Zia Hughes the following operation: procedure has been videotaped, the surgeon will obtain the original videotape. The hospital will not be responsible for storage or maintenance of this tape.     6. For the purpose of advancing medical education, I consent to the admittance of observers to t STATEMENTS REQUIRING INSERTION OR COMPLETION WERE FILLED IN.     Signature of Patient:   ___________________________    When the patient is a minor or mentally incompetent to give consent:  Signature of person authorized to consent for patient: ____________ · If I am allergic to anything or have had a reaction to anesthesia before. 3. I understand how the anesthesia medicine will help me (benefits). 4. I understand that with any type of anesthesia medicine there are risks:  a.  The most common risks are: their representative has agreed to have anesthesia services.     _____________________________________________________________________________  Witness        Date   Time  I have verified that the signature is that of the patient or patient’s representative

## (undated) NOTE — LETTER
03/21/22        Sirisha Cyr  [facility] [address]      Dear Bryson Ferguson,    [address] [facility] records indicate that you have outstanding lab work and or testing that was ordered for you and has not yet been completed:  Orders Placed This Encounter      Microalb/Creat Ratio, Random Urine [E]      Hemoglobin A1C [E]      TSH W Reflex To Free T4 [E]      Lipid Panel [E]      CBC W Differential W Platelet [E]      Comp Metabolic Panel (14) [E]    To provide you with the best possible care, please complete these orders at your earliest convenience. If you have recently completed these orders please disregard this letter. If you have any questions please call the office at Dept: [phone].      Thank you,       Mely Garcia, DO

## (undated) NOTE — LETTER
11/13/20        Steven Cyr  [address] [facility] [address]      Dear Roma Nelson records indicate that you have outstanding lab work and or testing that was ordered for you and has not yet been completed:  Orders Placed This Encou

## (undated) NOTE — LETTER
10/25/19        Stanislav Cyr  [address] [address]      Dear Danie Choi records indicate that you have outstanding lab work and or testing that was ordered for you and has not yet been completed:  Orders Placed This Encou